# Patient Record
Sex: FEMALE | Race: BLACK OR AFRICAN AMERICAN | Employment: UNEMPLOYED | URBAN - METROPOLITAN AREA
[De-identification: names, ages, dates, MRNs, and addresses within clinical notes are randomized per-mention and may not be internally consistent; named-entity substitution may affect disease eponyms.]

---

## 2017-01-23 ENCOUNTER — OFFICE VISIT (OUTPATIENT)
Dept: OBGYN CLINIC | Facility: CLINIC | Age: 27
End: 2017-01-23

## 2017-01-23 VITALS
BODY MASS INDEX: 26.36 KG/M2 | DIASTOLIC BLOOD PRESSURE: 72 MMHG | HEIGHT: 66 IN | WEIGHT: 164 LBS | SYSTOLIC BLOOD PRESSURE: 122 MMHG

## 2017-01-23 DIAGNOSIS — Z34.81 PRENATAL CARE, SUBSEQUENT PREGNANCY, FIRST TRIMESTER: Primary | ICD-10-CM

## 2017-01-23 DIAGNOSIS — Z36.9 ENCOUNTER FOR ANTENATAL SCREENING OF MOTHER: ICD-10-CM

## 2017-01-23 DIAGNOSIS — Z12.4 ROUTINE PAPANICOLAOU SMEAR: ICD-10-CM

## 2017-01-23 PROCEDURE — 87086 URINE CULTURE/COLONY COUNT: CPT | Performed by: OBSTETRICS & GYNECOLOGY

## 2017-01-23 PROCEDURE — 88175 CYTOPATH C/V AUTO FLUID REDO: CPT | Performed by: OBSTETRICS & GYNECOLOGY

## 2017-01-23 RX ORDER — CLOBETASOL PROPIONATE 0.46 MG/ML
SOLUTION TOPICAL 2 TIMES DAILY
Refills: 1 | COMMUNITY
Start: 2016-11-03 | End: 2017-02-25

## 2017-01-23 NOTE — PROGRESS NOTES
. NOB visit. Hx of uncomplicated  with us in 2014. Not seen since PP visit in 2015. Was on OCP's with irregular menses until  and had Nexplanon inserted. Implant removed in 2016 for conception.  Regular menses Q 28 days since removal. L

## 2017-02-22 ENCOUNTER — TELEPHONE (OUTPATIENT)
Dept: OBGYN CLINIC | Facility: CLINIC | Age: 27
End: 2017-02-22

## 2017-02-22 NOTE — TELEPHONE ENCOUNTER
Patient called back and informed she needs to have labs completed. Lab hours & information given to patient. Verbalized understanding. No further questions or concerns.

## 2017-02-22 NOTE — TELEPHONE ENCOUNTER
Left message to call back regarding overdue prenatal labs. Patient has appointment 02/25/17 for NERI, post-poned labs until that date.

## 2017-02-23 ENCOUNTER — LAB ENCOUNTER (OUTPATIENT)
Dept: LAB | Age: 27
End: 2017-02-23
Attending: OBSTETRICS & GYNECOLOGY
Payer: COMMERCIAL

## 2017-02-23 DIAGNOSIS — Z36.9 ENCOUNTER FOR ANTENATAL SCREENING OF MOTHER: ICD-10-CM

## 2017-02-23 LAB
ANTIBODY SCREEN: NEGATIVE
BASOPHILS # BLD AUTO: 0.01 X10(3) UL (ref 0–0.1)
BASOPHILS NFR BLD AUTO: 0.1 %
EOSINOPHIL # BLD AUTO: 0.06 X10(3) UL (ref 0–0.3)
EOSINOPHIL NFR BLD AUTO: 0.8 %
ERYTHROCYTE [DISTWIDTH] IN BLOOD BY AUTOMATED COUNT: 13.7 % (ref 11.5–16)
HBV SURFACE AG SERPL QL IA: NONREACTIVE
HCT VFR BLD AUTO: 35.3 % (ref 34–50)
HGB BLD-MCNC: 11.7 G/DL (ref 12–16)
IMMATURE GRANULOCYTE COUNT: 0.02 X10(3) UL (ref 0–1)
IMMATURE GRANULOCYTE RATIO %: 0.3 %
LYMPHOCYTES # BLD AUTO: 0.84 X10(3) UL (ref 0.9–4)
LYMPHOCYTES NFR BLD AUTO: 11.6 %
MCH RBC QN AUTO: 31.6 PG (ref 27–33.2)
MCHC RBC AUTO-ENTMCNC: 33.1 G/DL (ref 31–37)
MCV RBC AUTO: 95.4 FL (ref 81–100)
MONOCYTES # BLD AUTO: 0.51 X10(3) UL (ref 0.1–0.6)
MONOCYTES NFR BLD AUTO: 7.1 %
NEUTROPHIL ABS PRELIM: 5.79 X10 (3) UL (ref 1.3–6.7)
NEUTROPHILS # BLD AUTO: 5.79 X10(3) UL (ref 1.3–6.7)
NEUTROPHILS NFR BLD AUTO: 80.1 %
PLATELET # BLD AUTO: 155 10(3)UL (ref 150–450)
RBC # BLD AUTO: 3.7 X10(6)UL (ref 3.8–5.1)
RED CELL DISTRIBUTION WIDTH-SD: 48.4 FL (ref 35.1–46.3)
RH BLOOD TYPE: POSITIVE
RUBV IGG SER QL: POSITIVE
T PALLIDUM AB SER QL IA: NONREACTIVE
WBC # BLD AUTO: 7.2 X10(3) UL (ref 4–13)

## 2017-02-23 PROCEDURE — 87340 HEPATITIS B SURFACE AG IA: CPT

## 2017-02-23 PROCEDURE — 86780 TREPONEMA PALLIDUM: CPT

## 2017-02-23 PROCEDURE — 86901 BLOOD TYPING SEROLOGIC RH(D): CPT

## 2017-02-23 PROCEDURE — 87389 HIV-1 AG W/HIV-1&-2 AB AG IA: CPT

## 2017-02-23 PROCEDURE — 36415 COLL VENOUS BLD VENIPUNCTURE: CPT

## 2017-02-23 PROCEDURE — 86900 BLOOD TYPING SEROLOGIC ABO: CPT

## 2017-02-23 PROCEDURE — 86850 RBC ANTIBODY SCREEN: CPT

## 2017-02-23 PROCEDURE — 86762 RUBELLA ANTIBODY: CPT

## 2017-02-23 PROCEDURE — 85025 COMPLETE CBC W/AUTO DIFF WBC: CPT

## 2017-02-25 ENCOUNTER — OFFICE VISIT (OUTPATIENT)
Dept: OBGYN CLINIC | Facility: CLINIC | Age: 27
End: 2017-02-25

## 2017-02-25 VITALS
SYSTOLIC BLOOD PRESSURE: 112 MMHG | WEIGHT: 171 LBS | DIASTOLIC BLOOD PRESSURE: 68 MMHG | BODY MASS INDEX: 27.48 KG/M2 | HEIGHT: 66 IN

## 2017-02-25 DIAGNOSIS — Z34.82 PRENATAL CARE, SUBSEQUENT PREGNANCY, SECOND TRIMESTER: Primary | ICD-10-CM

## 2017-02-25 RX ORDER — DOXYLAMINE SUCCINATE AND PYRIDOXINE HYDROCHLORIDE, DELAYED RELEASE TABLETS 10 MG/10 MG 10; 10 MG/1; MG/1
2 TABLET, DELAYED RELEASE ORAL NIGHTLY
Qty: 60 TABLET | Refills: 0 | Status: SHIPPED | OUTPATIENT
Start: 2017-02-25 | End: 2017-03-27

## 2017-02-25 NOTE — PROGRESS NOTES
NERI  Doing well  No complaints.  No LOF/VB/uctx  RH pos  Genetic testing declined (first, quad/AFP)  Anatomy Scan  (18-20weeks)

## 2017-02-27 ENCOUNTER — TELEPHONE (OUTPATIENT)
Dept: OBGYN CLINIC | Facility: CLINIC | Age: 27
End: 2017-02-27

## 2017-02-28 NOTE — TELEPHONE ENCOUNTER
PA received in [facility] for Diclegis. Called patient to discuss symptoms, onset and any other remedies tried. Pt states she has nausea without vomiting. Pt has not tried anything. Pt states she no longer wants Diclegis.   Pt advised on june, B6, sma

## 2017-03-29 ENCOUNTER — OFFICE VISIT (OUTPATIENT)
Dept: OBGYN CLINIC | Facility: CLINIC | Age: 27
End: 2017-03-29

## 2017-03-29 ENCOUNTER — APPOINTMENT (OUTPATIENT)
Dept: OBGYN CLINIC | Facility: CLINIC | Age: 27
End: 2017-03-29

## 2017-03-29 VITALS
BODY MASS INDEX: 27.83 KG/M2 | WEIGHT: 173.19 LBS | SYSTOLIC BLOOD PRESSURE: 116 MMHG | DIASTOLIC BLOOD PRESSURE: 64 MMHG | HEIGHT: 66 IN

## 2017-03-29 DIAGNOSIS — Z34.82 PRENATAL CARE, SUBSEQUENT PREGNANCY, SECOND TRIMESTER: ICD-10-CM

## 2017-03-29 DIAGNOSIS — Z36.89 ENCOUNTER FOR FETAL ANATOMIC SURVEY: ICD-10-CM

## 2017-03-29 DIAGNOSIS — Z3A.20 20 WEEKS GESTATION OF PREGNANCY: Primary | ICD-10-CM

## 2017-03-29 PROCEDURE — 76805 OB US >/= 14 WKS SNGL FETUS: CPT | Performed by: OBSTETRICS & GYNECOLOGY

## 2017-03-29 NOTE — PROGRESS NOTES
NERI  Doing well  No complaints.  No LOF/VB/uctx  RH +  Genetic testing declines (first, quad/AFP)  Anatomy Scan normal today (18-20weeks)  rtc 4 wks  Gct/cbc on rtc          Saravia IUP at 20w1d   Measuring 20w1d, efw 343 gm   Vtx, fhr 132   Normal anatom

## 2017-04-03 ENCOUNTER — TELEPHONE (OUTPATIENT)
Dept: OBGYN CLINIC | Facility: CLINIC | Age: 27
End: 2017-04-03

## 2017-04-03 NOTE — TELEPHONE ENCOUNTER
Pt c/o vaginal itching since yesterday. Pt denies any vaginal discharge or odor. Pt used vagisil last night and this morning. Pt had yeast infections in last pregnancy with   Same symptoms.   Pt requesting RX    Dr. Daley Setting- please advise if okay to ord

## 2017-04-06 ENCOUNTER — TELEPHONE (OUTPATIENT)
Dept: OBGYN CLINIC | Facility: CLINIC | Age: 27
End: 2017-04-06

## 2017-04-06 NOTE — TELEPHONE ENCOUNTER
Pt started Monistat 7 day treatment on Monday. Pt reports mild improvement in itching, but intense burning sensation with application of Monistat. Pt given appt today for evaluation.

## 2017-04-17 ENCOUNTER — TELEPHONE (OUTPATIENT)
Dept: OBGYN CLINIC | Facility: CLINIC | Age: 27
End: 2017-04-17

## 2017-04-17 NOTE — TELEPHONE ENCOUNTER
Patient had yeast infection symptoms last week. Was advised to use Monistat OTC, noted no improvement. She was given appointment but did not show. Advised patient I can schedule appointment for today, states she only wants Wed.   Denies any vaginal bleed

## 2017-04-19 ENCOUNTER — OFFICE VISIT (OUTPATIENT)
Dept: OBGYN CLINIC | Facility: CLINIC | Age: 27
End: 2017-04-19

## 2017-04-19 VITALS
HEIGHT: 66 IN | BODY MASS INDEX: 28.61 KG/M2 | WEIGHT: 178 LBS | DIASTOLIC BLOOD PRESSURE: 60 MMHG | SYSTOLIC BLOOD PRESSURE: 110 MMHG

## 2017-04-19 DIAGNOSIS — N76.0 VAGINITIS AND VULVOVAGINITIS: Primary | ICD-10-CM

## 2017-04-19 PROCEDURE — 87510 GARDNER VAG DNA DIR PROBE: CPT | Performed by: NURSE PRACTITIONER

## 2017-04-19 PROCEDURE — 87480 CANDIDA DNA DIR PROBE: CPT | Performed by: NURSE PRACTITIONER

## 2017-04-19 PROCEDURE — 87660 TRICHOMONAS VAGIN DIR PROBE: CPT | Performed by: NURSE PRACTITIONER

## 2017-04-19 PROCEDURE — 99213 OFFICE O/P EST LOW 20 MIN: CPT | Performed by: NURSE PRACTITIONER

## 2017-04-19 NOTE — PROGRESS NOTES
Here for suspected yeast infection, used Monistat for 4 days starting 4/6/17. Had approximately 2 hours of worsening irritation and swelling after monistat use. Discontinued drug at that point with some relief. Still having intermittent itching, daily.

## 2017-04-20 NOTE — PROGRESS NOTES
Quick Note:    Patient notified of negative vaginitis panel/ She may continue to use topical RX given at time of appointment for symptom relief.  ______

## 2017-04-26 ENCOUNTER — OFFICE VISIT (OUTPATIENT)
Dept: OBGYN CLINIC | Facility: CLINIC | Age: 27
End: 2017-04-26

## 2017-04-26 VITALS
SYSTOLIC BLOOD PRESSURE: 108 MMHG | BODY MASS INDEX: 28.45 KG/M2 | DIASTOLIC BLOOD PRESSURE: 70 MMHG | WEIGHT: 177 LBS | HEIGHT: 66 IN

## 2017-04-26 DIAGNOSIS — Z34.82 PRENATAL CARE, SUBSEQUENT PREGNANCY, SECOND TRIMESTER: Primary | ICD-10-CM

## 2017-04-26 NOTE — PROGRESS NOTES
No problems since last seen. Good FM. Unremarkable midtrimester screening ultrasound with posterior placenta and carrying male. Reminded to get GL prior to next visit. See in one month.

## 2017-05-19 ENCOUNTER — LAB ENCOUNTER (OUTPATIENT)
Dept: LAB | Age: 27
End: 2017-05-19
Attending: OBSTETRICS & GYNECOLOGY
Payer: COMMERCIAL

## 2017-05-19 DIAGNOSIS — Z36.89 ENCOUNTER FOR FETAL ANATOMIC SURVEY: ICD-10-CM

## 2017-05-19 DIAGNOSIS — Z34.82 PRENATAL CARE, SUBSEQUENT PREGNANCY, SECOND TRIMESTER: ICD-10-CM

## 2017-05-19 DIAGNOSIS — Z3A.20 20 WEEKS GESTATION OF PREGNANCY: ICD-10-CM

## 2017-05-19 PROCEDURE — 85025 COMPLETE CBC W/AUTO DIFF WBC: CPT | Performed by: OBSTETRICS & GYNECOLOGY

## 2017-05-19 PROCEDURE — 36415 COLL VENOUS BLD VENIPUNCTURE: CPT | Performed by: OBSTETRICS & GYNECOLOGY

## 2017-05-19 PROCEDURE — 82950 GLUCOSE TEST: CPT | Performed by: OBSTETRICS & GYNECOLOGY

## 2017-05-19 NOTE — PROGRESS NOTES
Quick Note:    Normal 1 hour GTT/ low hemoglobin. Please advise patient to start OTC Slow FE 1 pill PO twice daily. She may want to increase fiber, fluids.  Repeat CBC 4 weeks or as needed if symptomatic.  ______

## 2017-05-24 ENCOUNTER — OFFICE VISIT (OUTPATIENT)
Dept: OBGYN CLINIC | Facility: CLINIC | Age: 27
End: 2017-05-24

## 2017-05-24 VITALS
HEIGHT: 66 IN | BODY MASS INDEX: 29.25 KG/M2 | DIASTOLIC BLOOD PRESSURE: 68 MMHG | WEIGHT: 182 LBS | SYSTOLIC BLOOD PRESSURE: 114 MMHG

## 2017-05-24 DIAGNOSIS — Z23 NEED FOR VACCINATION: ICD-10-CM

## 2017-05-24 DIAGNOSIS — Z34.83 PRENATAL CARE, SUBSEQUENT PREGNANCY, THIRD TRIMESTER: Primary | ICD-10-CM

## 2017-05-24 PROCEDURE — 90471 IMMUNIZATION ADMIN: CPT | Performed by: OBSTETRICS & GYNECOLOGY

## 2017-05-24 PROCEDURE — 90715 TDAP VACCINE 7 YRS/> IM: CPT | Performed by: OBSTETRICS & GYNECOLOGY

## 2017-05-24 NOTE — PROGRESS NOTES
NERI  Doing well, +FM  Denies VB/LOF/uctx  Rh pos, TDAP received  1hr glucose reviewed, wnl hgb 9.8 iron bid  RTC in 2 wks  Fetal movement instructions given  C/o LBP since last night, supportive measures advised, heating pad, tylenol

## 2017-06-07 ENCOUNTER — OFFICE VISIT (OUTPATIENT)
Dept: OBGYN CLINIC | Facility: CLINIC | Age: 27
End: 2017-06-07

## 2017-06-07 VITALS
WEIGHT: 183 LBS | BODY MASS INDEX: 29.41 KG/M2 | DIASTOLIC BLOOD PRESSURE: 60 MMHG | SYSTOLIC BLOOD PRESSURE: 102 MMHG | HEIGHT: 66 IN

## 2017-06-07 DIAGNOSIS — Z34.83 PRENATAL CARE, SUBSEQUENT PREGNANCY, THIRD TRIMESTER: Primary | ICD-10-CM

## 2017-06-21 ENCOUNTER — OFFICE VISIT (OUTPATIENT)
Dept: OBGYN CLINIC | Facility: CLINIC | Age: 27
End: 2017-06-21

## 2017-06-21 VITALS
HEIGHT: 66 IN | SYSTOLIC BLOOD PRESSURE: 100 MMHG | WEIGHT: 186 LBS | DIASTOLIC BLOOD PRESSURE: 56 MMHG | BODY MASS INDEX: 29.89 KG/M2

## 2017-06-21 DIAGNOSIS — O99.013 ANTEPARTUM ANEMIA, THIRD TRIMESTER: ICD-10-CM

## 2017-06-21 DIAGNOSIS — Z34.83 PRENATAL CARE, SUBSEQUENT PREGNANCY, THIRD TRIMESTER: Primary | ICD-10-CM

## 2017-07-03 ENCOUNTER — TELEPHONE (OUTPATIENT)
Dept: OBGYN CLINIC | Facility: CLINIC | Age: 27
End: 2017-07-03

## 2017-07-03 NOTE — TELEPHONE ENCOUNTER
Pt states she would like to travel to [place] this week by air. Advised pt, per our policy, we advise against any travel during the 3rd trimester. Patient verbalized understanding.

## 2017-07-07 ENCOUNTER — OFFICE VISIT (OUTPATIENT)
Dept: OBGYN CLINIC | Facility: CLINIC | Age: 27
End: 2017-07-07

## 2017-07-07 VITALS
HEIGHT: 66 IN | WEIGHT: 189 LBS | BODY MASS INDEX: 30.37 KG/M2 | DIASTOLIC BLOOD PRESSURE: 58 MMHG | SYSTOLIC BLOOD PRESSURE: 98 MMHG

## 2017-07-07 DIAGNOSIS — Z34.83 PRENATAL CARE, SUBSEQUENT PREGNANCY, THIRD TRIMESTER: Primary | ICD-10-CM

## 2017-07-07 DIAGNOSIS — O99.013 ANTEPARTUM ANEMIA, THIRD TRIMESTER: ICD-10-CM

## 2017-07-07 RX ORDER — MELATONIN
325 2 TIMES DAILY WITH MEALS
COMMUNITY
End: 2017-10-10

## 2017-07-15 ENCOUNTER — TELEPHONE (OUTPATIENT)
Dept: OBGYN CLINIC | Facility: CLINIC | Age: 27
End: 2017-07-15

## 2017-07-15 NOTE — TELEPHONE ENCOUNTER
C/O upper abdominal pain, comes and goes. ?  Contractions  Discussed, to come in to be seen if persists

## 2017-07-17 ENCOUNTER — HOSPITAL ENCOUNTER (EMERGENCY)
Facility: HOSPITAL | Age: 27
Discharge: STILL ADMITTED OR ADMINISTRATIVE | End: 2017-07-19
Payer: COMMERCIAL

## 2017-07-17 ENCOUNTER — LAB ENCOUNTER (OUTPATIENT)
Dept: LAB | Age: 27
End: 2017-07-17
Attending: NURSE PRACTITIONER
Payer: COMMERCIAL

## 2017-07-17 ENCOUNTER — OFFICE VISIT (OUTPATIENT)
Dept: OBGYN CLINIC | Facility: CLINIC | Age: 27
End: 2017-07-17

## 2017-07-17 ENCOUNTER — TELEPHONE (OUTPATIENT)
Dept: OBGYN CLINIC | Facility: CLINIC | Age: 27
End: 2017-07-17

## 2017-07-17 VITALS
DIASTOLIC BLOOD PRESSURE: 74 MMHG | HEIGHT: 66 IN | SYSTOLIC BLOOD PRESSURE: 122 MMHG | BODY MASS INDEX: 30.22 KG/M2 | WEIGHT: 188 LBS

## 2017-07-17 DIAGNOSIS — O99.013 ANTEPARTUM ANEMIA, THIRD TRIMESTER: ICD-10-CM

## 2017-07-17 DIAGNOSIS — Z34.83 PRENATAL CARE, SUBSEQUENT PREGNANCY, THIRD TRIMESTER: Primary | ICD-10-CM

## 2017-07-17 DIAGNOSIS — Z34.83 PRENATAL CARE, SUBSEQUENT PREGNANCY, THIRD TRIMESTER: ICD-10-CM

## 2017-07-17 LAB
ALBUMIN SERPL-MCNC: 2.8 G/DL (ref 3.5–4.8)
ALP LIVER SERPL-CCNC: 145 U/L (ref 37–98)
ALT SERPL-CCNC: 16 U/L (ref 14–54)
AST SERPL-CCNC: 25 U/L (ref 15–41)
BASOPHILS # BLD AUTO: 0.01 X10(3) UL (ref 0–0.1)
BASOPHILS NFR BLD AUTO: 0.2 %
BILIRUB SERPL-MCNC: 0.3 MG/DL (ref 0.1–2)
BUN BLD-MCNC: 5 MG/DL (ref 8–20)
CALCIUM BLD-MCNC: 9.1 MG/DL (ref 8.3–10.3)
CHLORIDE: 107 MMOL/L (ref 101–111)
CO2: 21 MMOL/L (ref 22–32)
CREAT BLD-MCNC: 0.6 MG/DL (ref 0.55–1.02)
EOSINOPHIL # BLD AUTO: 0.05 X10(3) UL (ref 0–0.3)
EOSINOPHIL NFR BLD AUTO: 1 %
ERYTHROCYTE [DISTWIDTH] IN BLOOD BY AUTOMATED COUNT: 15 % (ref 11.5–16)
GLUCOSE BLD-MCNC: 96 MG/DL (ref 70–99)
HCT VFR BLD AUTO: 30.4 % (ref 34–50)
HGB BLD-MCNC: 9.7 G/DL (ref 12–16)
IMMATURE GRANULOCYTE COUNT: 0.03 X10(3) UL (ref 0–1)
IMMATURE GRANULOCYTE RATIO %: 0.6 %
LYMPHOCYTES # BLD AUTO: 1.14 X10(3) UL (ref 0.9–4)
LYMPHOCYTES NFR BLD AUTO: 23 %
M PROTEIN MFR SERPL ELPH: 7.2 G/DL (ref 6.1–8.3)
MCH RBC QN AUTO: 30.2 PG (ref 27–33.2)
MCHC RBC AUTO-ENTMCNC: 31.9 G/DL (ref 31–37)
MCV RBC AUTO: 94.7 FL (ref 81–100)
MONOCYTES # BLD AUTO: 0.44 X10(3) UL (ref 0.1–0.6)
MONOCYTES NFR BLD AUTO: 8.9 %
NEUTROPHIL ABS PRELIM: 3.28 X10 (3) UL (ref 1.3–6.7)
NEUTROPHILS # BLD AUTO: 3.28 X10(3) UL (ref 1.3–6.7)
NEUTROPHILS NFR BLD AUTO: 66.3 %
PLATELET # BLD AUTO: 142 10(3)UL (ref 150–450)
POTASSIUM SERPL-SCNC: 4.2 MMOL/L (ref 3.6–5.1)
RBC # BLD AUTO: 3.21 X10(6)UL (ref 3.8–5.1)
RED CELL DISTRIBUTION WIDTH-SD: 51.8 FL (ref 35.1–46.3)
SODIUM SERPL-SCNC: 138 MMOL/L (ref 136–144)
WBC # BLD AUTO: 5 X10(3) UL (ref 4–13)

## 2017-07-17 PROCEDURE — 87081 CULTURE SCREEN ONLY: CPT | Performed by: NURSE PRACTITIONER

## 2017-07-17 PROCEDURE — 85025 COMPLETE CBC W/AUTO DIFF WBC: CPT

## 2017-07-17 PROCEDURE — 80053 COMPREHEN METABOLIC PANEL: CPT

## 2017-07-17 PROCEDURE — 36415 COLL VENOUS BLD VENIPUNCTURE: CPT

## 2017-07-17 NOTE — TELEPHONE ENCOUNTER
Patient stated she has been having persistent belly pain for > 1 week. This week it has gotten worse. Spoke to Dr. Fior Mukherjee on Saturday and he stated if it persisted or got worse patient would need to be seen.   She denies any cramping, LOF, decreased FM, or

## 2017-07-17 NOTE — PROGRESS NOTES
NERI  Doing well, +FM  Denies VB/LOF  Unsure if having contractions, pain comes and goes in upper abdomen R>L  Had it on the way here, had william 4 times yesterday  Pain lasts seconds to minute, the longest episode up to 10 minutes of intermittent pain  Mode

## 2017-07-18 NOTE — PROGRESS NOTES
Patient notified of CBC and verbalizes understanding  States she is not taking iron bid because she was having heart burn  Patient notified that it is important to take iron as instructed because her hgb is low.

## 2017-07-18 NOTE — PROGRESS NOTES
Mild decrease in platelets. Please call patient and be sure she is taking and OTC SLOW FE daily as her hemoglobin has not improved since the last CBC.

## 2017-07-26 ENCOUNTER — OFFICE VISIT (OUTPATIENT)
Dept: OBGYN CLINIC | Facility: CLINIC | Age: 27
End: 2017-07-26

## 2017-07-26 VITALS
WEIGHT: 189 LBS | DIASTOLIC BLOOD PRESSURE: 60 MMHG | SYSTOLIC BLOOD PRESSURE: 106 MMHG | HEIGHT: 66 IN | BODY MASS INDEX: 30.37 KG/M2

## 2017-07-26 DIAGNOSIS — Z3A.37 37 WEEKS GESTATION OF PREGNANCY: Primary | ICD-10-CM

## 2017-07-26 DIAGNOSIS — Z34.83 PRENATAL CARE, SUBSEQUENT PREGNANCY, THIRD TRIMESTER: ICD-10-CM

## 2017-07-26 NOTE — PROGRESS NOTES
NERI  Doing well, +FM  Denies VB/LOF/uctx  Mode of delivery:   anticipated  SVE deferred,   Mild folliculitis otc antibiotic cream  GBS collected (35-37)neg  RTC 1 week

## 2017-08-03 ENCOUNTER — OFFICE VISIT (OUTPATIENT)
Dept: OBGYN CLINIC | Facility: CLINIC | Age: 27
End: 2017-08-03

## 2017-08-03 VITALS
WEIGHT: 186 LBS | HEIGHT: 66 IN | BODY MASS INDEX: 29.89 KG/M2 | SYSTOLIC BLOOD PRESSURE: 102 MMHG | DIASTOLIC BLOOD PRESSURE: 56 MMHG

## 2017-08-03 DIAGNOSIS — O36.8190 DECREASED FETAL MOVEMENT AFFECTING MANAGEMENT OF MOTHER, ANTEPARTUM: Primary | ICD-10-CM

## 2017-08-03 DIAGNOSIS — Z34.83 PRENATAL CARE, SUBSEQUENT PREGNANCY, THIRD TRIMESTER: Primary | ICD-10-CM

## 2017-08-03 PROCEDURE — 59025 FETAL NON-STRESS TEST: CPT | Performed by: NURSE PRACTITIONER

## 2017-08-03 NOTE — PATIENT INSTRUCTIONS
FETAL MOVEMENT CHART    Begin counting the baby's movements when you awake in the morning, or at approximately 9:00 a.m. Count ten separate times that the baby moves. A movement can be either a kick, a swish, a turn or a flip of the baby inside.     Xenia Junior

## 2017-08-03 NOTE — PROGRESS NOTES
NERI  Doing well  Decreased FM the last 2 days  Denies VB/LOF/uctx  Mode of delivery:  anticipated  Labor precautions discussed  SVE tight 1/ 40%/-2  NST done- see note  RTC 1 week

## 2017-08-09 ENCOUNTER — OFFICE VISIT (OUTPATIENT)
Dept: OBGYN CLINIC | Facility: CLINIC | Age: 27
End: 2017-08-09

## 2017-08-09 VITALS
DIASTOLIC BLOOD PRESSURE: 60 MMHG | BODY MASS INDEX: 30.53 KG/M2 | HEIGHT: 66 IN | WEIGHT: 190 LBS | SYSTOLIC BLOOD PRESSURE: 104 MMHG

## 2017-08-09 DIAGNOSIS — Z34.83 PRENATAL CARE, SUBSEQUENT PREGNANCY, THIRD TRIMESTER: Primary | ICD-10-CM

## 2017-08-14 ENCOUNTER — TELEPHONE (OUTPATIENT)
Dept: OBGYN CLINIC | Facility: CLINIC | Age: 27
End: 2017-08-14

## 2017-08-14 NOTE — TELEPHONE ENCOUNTER
Pt reports irregular contractions since this morning; approx q 30 mins; tight and mildly uncomfortable. Baby moving well; denies bleeding or LOF.   Advised pt to stay hydrated, void frequently and rest.  Advised pt to monitor contractions; go to L&D for re

## 2017-08-15 ENCOUNTER — OFFICE VISIT (OUTPATIENT)
Dept: OBGYN CLINIC | Facility: CLINIC | Age: 27
End: 2017-08-15

## 2017-08-15 VITALS
BODY MASS INDEX: 30.22 KG/M2 | SYSTOLIC BLOOD PRESSURE: 100 MMHG | DIASTOLIC BLOOD PRESSURE: 60 MMHG | WEIGHT: 188 LBS | HEIGHT: 66 IN

## 2017-08-15 DIAGNOSIS — O48.0 POST-TERM PREGNANCY, 40-42 WEEKS OF GESTATION: ICD-10-CM

## 2017-08-15 DIAGNOSIS — Z34.83 PRENATAL CARE, SUBSEQUENT PREGNANCY, THIRD TRIMESTER: Primary | ICD-10-CM

## 2017-08-15 PROCEDURE — 59025 FETAL NON-STRESS TEST: CPT | Performed by: NURSE PRACTITIONER

## 2017-08-15 NOTE — PROGRESS NOTES
NERI  Doing well, +FM  Denies VB/LOF- occasional contractions, irregular and not allen able since yesterday  Mode of delivery:  anticipated  Labor precautions discussed  SVE unchanged  IOL 17 at 0715  RTC 3 days with NST  NST reactive

## 2017-08-17 ENCOUNTER — TELEPHONE (OUTPATIENT)
Dept: OBGYN UNIT | Facility: HOSPITAL | Age: 27
End: 2017-08-17

## 2017-08-18 ENCOUNTER — OFFICE VISIT (OUTPATIENT)
Dept: OBGYN CLINIC | Facility: CLINIC | Age: 27
End: 2017-08-18

## 2017-08-18 ENCOUNTER — HOSPITAL ENCOUNTER (INPATIENT)
Facility: HOSPITAL | Age: 27
LOS: 2 days | Discharge: HOME | End: 2017-08-20
Attending: OBSTETRICS & GYNECOLOGY | Admitting: OBSTETRICS & GYNECOLOGY
Payer: COMMERCIAL

## 2017-08-18 VITALS
SYSTOLIC BLOOD PRESSURE: 110 MMHG | BODY MASS INDEX: 30.37 KG/M2 | DIASTOLIC BLOOD PRESSURE: 70 MMHG | WEIGHT: 189 LBS | HEIGHT: 66 IN

## 2017-08-18 DIAGNOSIS — Z34.83 PRENATAL CARE, SUBSEQUENT PREGNANCY, THIRD TRIMESTER: Primary | ICD-10-CM

## 2017-08-18 DIAGNOSIS — O48.0 POST-TERM PREGNANCY, 40-42 WEEKS OF GESTATION: ICD-10-CM

## 2017-08-18 PROBLEM — Z34.90 PREGNANT (HCC): Status: ACTIVE | Noted: 2017-08-18

## 2017-08-18 PROBLEM — Z34.90 PREGNANT: Status: ACTIVE | Noted: 2017-08-18

## 2017-08-18 LAB
ANTIBODY SCREEN: NEGATIVE
BILIRUBIN URINE: NEGATIVE
BLOOD URINE: NEGATIVE
CONTROL RUN WITHIN 24 HOURS?: YES
ERYTHROCYTE [DISTWIDTH] IN BLOOD BY AUTOMATED COUNT: 15.3 % (ref 11.5–16)
GLUCOSE URINE: NEGATIVE
HCT VFR BLD AUTO: 31.4 % (ref 34–50)
HGB BLD-MCNC: 10.1 G/DL (ref 12–16)
KETONE URINE: NEGATIVE
MCH RBC QN AUTO: 30.4 PG (ref 27–33.2)
MCHC RBC AUTO-ENTMCNC: 32.2 G/DL (ref 31–37)
MCV RBC AUTO: 94.6 FL (ref 81–100)
NITRITE URINE: NEGATIVE
PH URINE: 7 (ref 5–8)
PLATELET # BLD AUTO: 129 10(3)UL (ref 150–450)
PROTEIN URINE: NEGATIVE
RBC # BLD AUTO: 3.32 X10(6)UL (ref 3.8–5.1)
RED CELL DISTRIBUTION WIDTH-SD: 52.8 FL (ref 35.1–46.3)
RH BLOOD TYPE: POSITIVE
SPEC GRAVITY: 1.01 (ref 1–1.03)
T PALLIDUM AB SER QL IA: NONREACTIVE
URINE CLARITY: CLEAR
URINE COLOR: YELLOW
UROBILINOGEN URINE: 0.2
WBC # BLD AUTO: 4.8 X10(3) UL (ref 4–13)

## 2017-08-18 PROCEDURE — 59025 FETAL NON-STRESS TEST: CPT | Performed by: NURSE PRACTITIONER

## 2017-08-18 RX ORDER — NALBUPHINE HCL 10 MG/ML
2.5 AMPUL (ML) INJECTION
Status: DISCONTINUED | OUTPATIENT
Start: 2017-08-18 | End: 2017-08-20

## 2017-08-18 RX ORDER — EPHEDRINE SULFATE 50 MG/ML
5 INJECTION, SOLUTION INTRAVENOUS AS NEEDED
Status: DISCONTINUED | OUTPATIENT
Start: 2017-08-18 | End: 2017-08-19

## 2017-08-18 RX ORDER — DEXTROSE, SODIUM CHLORIDE, SODIUM LACTATE, POTASSIUM CHLORIDE, AND CALCIUM CHLORIDE 5; .6; .31; .03; .02 G/100ML; G/100ML; G/100ML; G/100ML; G/100ML
INJECTION, SOLUTION INTRAVENOUS AS NEEDED
Status: DISCONTINUED | OUTPATIENT
Start: 2017-08-18 | End: 2017-08-19 | Stop reason: HOSPADM

## 2017-08-18 RX ORDER — TERBUTALINE SULFATE 1 MG/ML
0.25 INJECTION, SOLUTION SUBCUTANEOUS AS NEEDED
Status: DISCONTINUED | OUTPATIENT
Start: 2017-08-18 | End: 2017-08-19 | Stop reason: HOSPADM

## 2017-08-18 RX ORDER — IBUPROFEN 600 MG/1
600 TABLET ORAL ONCE AS NEEDED
Status: DISCONTINUED | OUTPATIENT
Start: 2017-08-18 | End: 2017-08-19 | Stop reason: HOSPADM

## 2017-08-18 RX ORDER — SODIUM CHLORIDE, SODIUM LACTATE, POTASSIUM CHLORIDE, CALCIUM CHLORIDE 600; 310; 30; 20 MG/100ML; MG/100ML; MG/100ML; MG/100ML
INJECTION, SOLUTION INTRAVENOUS CONTINUOUS
Status: DISCONTINUED | OUTPATIENT
Start: 2017-08-18 | End: 2017-08-19 | Stop reason: HOSPADM

## 2017-08-18 RX ORDER — TRISODIUM CITRATE DIHYDRATE AND CITRIC ACID MONOHYDRATE 500; 334 MG/5ML; MG/5ML
30 SOLUTION ORAL AS NEEDED
Status: DISCONTINUED | OUTPATIENT
Start: 2017-08-18 | End: 2017-08-19 | Stop reason: HOSPADM

## 2017-08-18 NOTE — PROGRESS NOTES
Patient here for NERI/ NST  IOL scheduled for tomorrow am  Patient having contractions intermittently the last 3-4 days  Contractions every 8-9 minutes today  Cervix unchanged  Rev.  S/sx labor  To L & D with increase in frequency of ctrx, ROM, or bleeding

## 2017-08-18 NOTE — PROGRESS NOTES
Pt admit to tr4 with c/o contractions since 5am this morning and they are getting stronger. efm explained and applied, hx and assessment done, plan of care discussed with pt and , sve, orient to room, call lite in reach.  Dr Jolene Bird in dept,discuss

## 2017-08-19 LAB
BASOPHILS # BLD AUTO: 0.01 X10(3) UL (ref 0–0.1)
BASOPHILS NFR BLD AUTO: 0.1 %
EOSINOPHIL # BLD AUTO: 0.01 X10(3) UL (ref 0–0.3)
EOSINOPHIL NFR BLD AUTO: 0.1 %
ERYTHROCYTE [DISTWIDTH] IN BLOOD BY AUTOMATED COUNT: 15.2 % (ref 11.5–16)
HCT VFR BLD AUTO: 33.7 % (ref 34–50)
HGB BLD-MCNC: 10.8 G/DL (ref 12–16)
IMMATURE GRANULOCYTE COUNT: 0.03 X10(3) UL (ref 0–1)
IMMATURE GRANULOCYTE RATIO %: 0.3 %
LYMPHOCYTES # BLD AUTO: 1.14 X10(3) UL (ref 0.9–4)
LYMPHOCYTES NFR BLD AUTO: 11.9 %
MCH RBC QN AUTO: 30.4 PG (ref 27–33.2)
MCHC RBC AUTO-ENTMCNC: 32 G/DL (ref 31–37)
MCV RBC AUTO: 94.9 FL (ref 81–100)
MONOCYTES # BLD AUTO: 0.89 X10(3) UL (ref 0.1–0.6)
MONOCYTES NFR BLD AUTO: 9.3 %
NEUTROPHIL ABS PRELIM: 7.54 X10 (3) UL (ref 1.3–6.7)
NEUTROPHILS # BLD AUTO: 7.54 X10(3) UL (ref 1.3–6.7)
NEUTROPHILS NFR BLD AUTO: 78.3 %
PLATELET # BLD AUTO: 151 10(3)UL (ref 150–450)
RBC # BLD AUTO: 3.55 X10(6)UL (ref 3.8–5.1)
RED CELL DISTRIBUTION WIDTH-SD: 53 FL (ref 35.1–46.3)
WBC # BLD AUTO: 9.6 X10(3) UL (ref 4–13)

## 2017-08-19 PROCEDURE — 59400 OBSTETRICAL CARE: CPT | Performed by: OBSTETRICS & GYNECOLOGY

## 2017-08-19 RX ORDER — ACETAMINOPHEN 325 MG/1
650 TABLET ORAL EVERY 4 HOURS PRN
Status: DISCONTINUED | OUTPATIENT
Start: 2017-08-19 | End: 2017-08-20

## 2017-08-19 RX ORDER — BISACODYL 10 MG
10 SUPPOSITORY, RECTAL RECTAL ONCE AS NEEDED
Status: ACTIVE | OUTPATIENT
Start: 2017-08-19 | End: 2017-08-19

## 2017-08-19 RX ORDER — HYDROCODONE BITARTRATE AND ACETAMINOPHEN 5; 325 MG/1; MG/1
1 TABLET ORAL EVERY 4 HOURS PRN
Status: DISCONTINUED | OUTPATIENT
Start: 2017-08-19 | End: 2017-08-20

## 2017-08-19 RX ORDER — DOCUSATE SODIUM 100 MG/1
100 CAPSULE, LIQUID FILLED ORAL
Status: DISCONTINUED | OUTPATIENT
Start: 2017-08-19 | End: 2017-08-20

## 2017-08-19 RX ORDER — IBUPROFEN 600 MG/1
600 TABLET ORAL EVERY 6 HOURS
Status: DISCONTINUED | OUTPATIENT
Start: 2017-08-19 | End: 2017-08-20

## 2017-08-19 RX ORDER — HYDROCODONE BITARTRATE AND ACETAMINOPHEN 5; 325 MG/1; MG/1
2 TABLET ORAL EVERY 4 HOURS PRN
Status: DISCONTINUED | OUTPATIENT
Start: 2017-08-19 | End: 2017-08-20

## 2017-08-19 RX ORDER — SIMETHICONE 80 MG
80 TABLET,CHEWABLE ORAL 3 TIMES DAILY PRN
Status: DISCONTINUED | OUTPATIENT
Start: 2017-08-19 | End: 2017-08-20

## 2017-08-19 RX ORDER — ZOLPIDEM TARTRATE 5 MG/1
5 TABLET ORAL NIGHTLY PRN
Status: DISCONTINUED | OUTPATIENT
Start: 2017-08-19 | End: 2017-08-20

## 2017-08-19 NOTE — PLAN OF CARE
Patient/Family Long Term Goal Progressing      Patient/Family Short Term Goal Progressing      Long Term Goal:Experiences normal postpartum course Progressing      Optimize infant feeding at the breast Progressing      Establishment of adequate milk supply

## 2017-08-19 NOTE — PROGRESS NOTES
PPD#1  No C/O  Afebrile, VS stable  Hg: 10.8  Fundus firm, minimal bleeding  Doing well, home tomorrow

## 2017-08-19 NOTE — PROGRESS NOTES
Received report assuming care of the pt. Dr Luann Davenport at rn station- in room to see pt. Pt in the bed that is in the low locked position. efm in  Place.   poc reviewed per md.

## 2017-08-19 NOTE — PROGRESS NOTES
Pt assisted to br voided,pericare done. Pt to w/c for transfer.   Pt to m/b in stable cond with baby un arms

## 2017-08-19 NOTE — L&D DELIVERY NOTE
Sonny, Pending  [[id number]]     Labor Events     labor?:  No    steroids?:  None   Antibiotics received during labor?:  No   Rupture date:  17   Rupture time:     Rupture type:  AROM   Fluid color:  Clear   Augmentation:  Oxytocin, A As the head was delivered the legs were lowered and the perineum was supported to decrease the risk of tearing. The infants nose and mouth were bulb suctioned and the shoulders rotated easily. The infant was dried and suctioned.   The cord was doubly clam

## 2017-08-19 NOTE — H&P
Aldo Real Sonny Patient Status:  Inpatient    1990 MRN [id number]   Location [address] Attending Sherif Gardiner MD    0 PCP No primary care provider on file.      CC: patient auscultation bilaterally   Heart:   regular rate and rhythm, regular rate and rhythm, S1, S2 normal, no murmur, click, rub or gallop   Abdomen: FHT present   Fetal Surveillance:  140 BPM   moderate variability, accelerations present.       Cervix: 3 cm/80%/

## 2017-08-19 NOTE — PROGRESS NOTES
NURSING ADMISSION NOTE      Patient admitted via Wheelchair with baby in arms. Baby transferred into [facility] [place]. ID bands verified, [employer] KISS[employer] security bracelets in place. Oriented to room. Safety precautions initiated. Bed in low position.   Call

## 2017-08-19 NOTE — PLAN OF CARE
Patient/Family Goals    • Patient/Family Long Term Goal Progressing    • Patient/Family Short Term Goal Progressing        POSTPARTUM    • Long Term Goal:Experiences normal postpartum course Progressing    • Optimize infant feeding at the breast Progressin

## 2017-08-20 VITALS
HEIGHT: 66 IN | SYSTOLIC BLOOD PRESSURE: 109 MMHG | WEIGHT: 189 LBS | DIASTOLIC BLOOD PRESSURE: 73 MMHG | RESPIRATION RATE: 17 BRPM | HEART RATE: 79 BPM | TEMPERATURE: 99 F | BODY MASS INDEX: 30.37 KG/M2

## 2017-08-20 PROBLEM — Z34.90 PREGNANT (HCC): Status: RESOLVED | Noted: 2017-08-18 | Resolved: 2017-08-20

## 2017-08-20 PROBLEM — Z34.90 PREGNANT: Status: RESOLVED | Noted: 2017-08-18 | Resolved: 2017-08-20

## 2017-08-20 NOTE — PROGRESS NOTES
Pt discharged to lobby in stable condition with spouse,  and PCT escort. Kisses tag removed, pt verbalizes understanding of all discharge instructions at [facility] time.

## 2017-08-20 NOTE — PLAN OF CARE
Patient/Family Goals    • Patient/Family Long Term Goal Completed    • Patient/Family Short Term Goal Completed        POSTPARTUM    • Long Term Goal:Experiences normal postpartum course Completed    • Optimize infant feeding at the breast Completed    • E

## 2017-08-23 ENCOUNTER — TELEPHONE (OUTPATIENT)
Dept: OBGYN UNIT | Facility: HOSPITAL | Age: 27
End: 2017-08-23

## 2017-08-28 ENCOUNTER — TELEPHONE (OUTPATIENT)
Dept: OBGYN CLINIC | Facility: CLINIC | Age: 27
End: 2017-08-28

## 2017-09-21 ENCOUNTER — OFFICE VISIT (OUTPATIENT)
Dept: OBGYN CLINIC | Facility: CLINIC | Age: 27
End: 2017-09-21

## 2017-09-21 VITALS
HEIGHT: 66 IN | BODY MASS INDEX: 26.52 KG/M2 | SYSTOLIC BLOOD PRESSURE: 118 MMHG | DIASTOLIC BLOOD PRESSURE: 70 MMHG | WEIGHT: 165 LBS

## 2017-09-21 RX ORDER — ACETAMINOPHEN AND CODEINE PHOSPHATE 120; 12 MG/5ML; MG/5ML
0.35 SOLUTION ORAL DAILY
Qty: 1 PACKAGE | Refills: 11 | Status: SHIPPED | OUTPATIENT
Start: 2017-09-21 | End: 2017-10-19

## 2017-09-21 NOTE — PROGRESS NOTES
PP  No C/O  Minimal bleeding    ROS: No Cardiac, Respiratory, GI,  or Neurological symptoms.     PE:  Abdomen soft  Pelvic:External vag normal, cervix without lesions, uterus anterior, normal size, adnexa negative    A/P: Normal exam  PAP normal 2017  cipriano

## 2017-10-10 ENCOUNTER — OFFICE VISIT (OUTPATIENT)
Dept: OBGYN CLINIC | Facility: CLINIC | Age: 27
End: 2017-10-10

## 2017-10-10 VITALS
DIASTOLIC BLOOD PRESSURE: 74 MMHG | HEIGHT: 65.5 IN | BODY MASS INDEX: 27.16 KG/M2 | WEIGHT: 165 LBS | HEART RATE: 80 BPM | SYSTOLIC BLOOD PRESSURE: 108 MMHG

## 2017-10-10 DIAGNOSIS — Z32.02 ENCOUNTER FOR PREGNANCY TEST, RESULT NEGATIVE: Primary | ICD-10-CM

## 2017-10-10 PROCEDURE — 81025 URINE PREGNANCY TEST: CPT | Performed by: NURSE PRACTITIONER

## 2017-10-10 PROCEDURE — 99213 OFFICE O/P EST LOW 20 MIN: CPT | Performed by: NURSE PRACTITIONER

## 2017-10-10 NOTE — PROGRESS NOTES
S:  Patient had an  17. She states she had regular bleeding for 2 weeks then spotted X 1 week. Bleeding ended 17. NO bleeding 17 through 10/3/17 then light bleeding for 4 days, none for a day, then started bleeding again 2 days ago.  She

## 2017-10-10 NOTE — PATIENT INSTRUCTIONS
Take pregnancy test 2 weeks from unprotected intercourse  Condom use now through first pill pack is complete  Call if bleeding continues over the next 3-4 days or becomes heavy

## 2018-03-26 ENCOUNTER — TELEPHONE (OUTPATIENT)
Dept: OBGYN CLINIC | Facility: CLINIC | Age: 28
End: 2018-03-26

## 2018-03-26 NOTE — TELEPHONE ENCOUNTER
Contacted patient. She states that she wants to stop breastfeeding now. Recommended to her a supportive, tight bra and avoidance of any stimulation. Also recommended that she contact lactation consultants and provided number. She states understanding.

## 2018-04-30 ENCOUNTER — TELEPHONE (OUTPATIENT)
Dept: OBGYN CLINIC | Facility: CLINIC | Age: 28
End: 2018-04-30

## 2018-04-30 NOTE — TELEPHONE ENCOUNTER
Pt calling and wants to discuss her birth control    Pt stopped nursing and wants to know if she needs to change    Please advise

## 2018-04-30 NOTE — TELEPHONE ENCOUNTER
33 y/o called because she stopped breast feeding completely. Patient wishes to stay on [facility]. Patient advised that this is a progesterone only birth control and often not used unless indicated or patient is breast feeding.  Patient states that she was prev

## 2018-05-24 ENCOUNTER — TELEPHONE (OUTPATIENT)
Dept: OBGYN CLINIC | Facility: CLINIC | Age: 28
End: 2018-05-24

## 2019-03-18 ENCOUNTER — OFFICE VISIT (OUTPATIENT)
Dept: OBGYN CLINIC | Facility: CLINIC | Age: 29
End: 2019-03-18
Payer: COMMERCIAL

## 2019-03-18 VITALS
BODY MASS INDEX: 27.82 KG/M2 | DIASTOLIC BLOOD PRESSURE: 70 MMHG | SYSTOLIC BLOOD PRESSURE: 116 MMHG | HEART RATE: 80 BPM | HEIGHT: 65.5 IN | WEIGHT: 169 LBS | RESPIRATION RATE: 16 BRPM

## 2019-03-18 DIAGNOSIS — Z01.419 ENCOUNTER FOR GYNECOLOGICAL EXAMINATION WITHOUT ABNORMAL FINDING: Primary | ICD-10-CM

## 2019-03-18 PROCEDURE — 99395 PREV VISIT EST AGE 18-39: CPT | Performed by: OBSTETRICS & GYNECOLOGY

## 2019-03-18 RX ORDER — DIPHENOXYLATE HYDROCHLORIDE AND ATROPINE SULFATE 2.5; .025 MG/1; MG/1
TABLET ORAL
COMMUNITY
End: 2019-06-19 | Stop reason: ALTCHOICE

## 2019-03-18 NOTE — PROGRESS NOTES
Patient ID:   Darylene Sia  is a 25year old female         HPI:     Here for general gyn exam. Last seen 2017 for AUB during postpartum period. New medical/surgical hx:  None.       Patient's last menstrual period was 2019 (exact d placed in this encounter. Meds & Refills for this Visit:  Requested Prescriptions      No prescriptions requested or ordered in this encounter       Imaging & Consults:  None    NOTES:  Names of RE given to pursue gender selection, if feasible.  See in

## 2019-06-10 ENCOUNTER — TELEPHONE (OUTPATIENT)
Dept: OBGYN CLINIC | Facility: CLINIC | Age: 29
End: 2019-06-10

## 2019-06-10 NOTE — TELEPHONE ENCOUNTER
Patient calling to initiate prenatal care  LMP 4/28/19  Patient is 7-8 weeks on   Confirmation Ultrasound and Appointment scheduled on   Future Appointments   Date Time Provider Anatoly Castañeda   6/19/2019 10:00 AM [facility] OB BRANDON MCCABE [facility] OB/[facility]N N [facility] [place]

## 2019-06-19 ENCOUNTER — ULTRASOUND ENCOUNTER (OUTPATIENT)
Dept: OBGYN CLINIC | Facility: CLINIC | Age: 29
End: 2019-06-19
Payer: COMMERCIAL

## 2019-06-19 ENCOUNTER — OFFICE VISIT (OUTPATIENT)
Dept: OBGYN CLINIC | Facility: CLINIC | Age: 29
End: 2019-06-19
Payer: COMMERCIAL

## 2019-06-19 VITALS
WEIGHT: 169 LBS | SYSTOLIC BLOOD PRESSURE: 106 MMHG | BODY MASS INDEX: 27.16 KG/M2 | HEIGHT: 66 IN | DIASTOLIC BLOOD PRESSURE: 66 MMHG

## 2019-06-19 DIAGNOSIS — N91.1 SECONDARY AMENORRHEA: Primary | ICD-10-CM

## 2019-06-19 DIAGNOSIS — Z32.01 PREGNANCY EXAMINATION OR TEST, POSITIVE RESULT: ICD-10-CM

## 2019-06-19 PROCEDURE — 76830 TRANSVAGINAL US NON-OB: CPT | Performed by: OBSTETRICS & GYNECOLOGY

## 2019-06-19 PROCEDURE — 99213 OFFICE O/P EST LOW 20 MIN: CPT | Performed by: OBSTETRICS & GYNECOLOGY

## 2019-06-19 PROCEDURE — 76856 US EXAM PELVIC COMPLETE: CPT | Performed by: OBSTETRICS & GYNECOLOGY

## 2019-06-19 NOTE — PROGRESS NOTES
[facility]  Obstetrics and Gynecology    Subjective:     Tran Proctor is a 25year old  female presents with c/o secondary amenorrhea and positive pregnancy test. The patient was recommended to return for further evaluation and a p

## 2019-07-04 ENCOUNTER — APPOINTMENT (OUTPATIENT)
Dept: ULTRASOUND IMAGING | Facility: HOSPITAL | Age: 29
End: 2019-07-04
Attending: EMERGENCY MEDICINE
Payer: COMMERCIAL

## 2019-07-04 ENCOUNTER — HOSPITAL ENCOUNTER (OUTPATIENT)
Age: 29
Discharge: ANOTHER HOSPITAL | End: 2019-07-04
Attending: FAMILY MEDICINE
Payer: COMMERCIAL

## 2019-07-04 ENCOUNTER — HOSPITAL ENCOUNTER (EMERGENCY)
Facility: HOSPITAL | Age: 29
Discharge: HOME | End: 2019-07-04
Attending: EMERGENCY MEDICINE
Payer: COMMERCIAL

## 2019-07-04 VITALS
BODY MASS INDEX: 27 KG/M2 | HEART RATE: 73 BPM | DIASTOLIC BLOOD PRESSURE: 74 MMHG | WEIGHT: 169.06 LBS | RESPIRATION RATE: 18 BRPM | TEMPERATURE: 99 F | SYSTOLIC BLOOD PRESSURE: 114 MMHG | OXYGEN SATURATION: 100 %

## 2019-07-04 VITALS
OXYGEN SATURATION: 97 % | DIASTOLIC BLOOD PRESSURE: 82 MMHG | SYSTOLIC BLOOD PRESSURE: 119 MMHG | TEMPERATURE: 99 F | RESPIRATION RATE: 14 BRPM | HEART RATE: 80 BPM

## 2019-07-04 DIAGNOSIS — O20.0 MISCARRIAGE, THREATENED, EARLY PREGNANCY: Primary | ICD-10-CM

## 2019-07-04 DIAGNOSIS — O20.9 VAGINAL BLEEDING IN PREGNANCY, FIRST TRIMESTER: Primary | ICD-10-CM

## 2019-07-04 LAB
B-HCG SERPL-ACNC: ABNORMAL MIU/ML
BASOPHILS # BLD AUTO: 0.01 X10(3) UL (ref 0–0.2)
BASOPHILS NFR BLD AUTO: 0.2 %
BILIRUB UR QL STRIP.AUTO: NEGATIVE
DEPRECATED RDW RBC AUTO: 44.6 FL (ref 35.1–46.3)
EOSINOPHIL # BLD AUTO: 0.07 X10(3) UL (ref 0–0.7)
EOSINOPHIL NFR BLD AUTO: 1.2 %
ERYTHROCYTE [DISTWIDTH] IN BLOOD BY AUTOMATED COUNT: 13 % (ref 11–15)
GLUCOSE UR STRIP.AUTO-MCNC: NEGATIVE MG/DL
HCT VFR BLD AUTO: 34.9 % (ref 35–48)
HGB BLD-MCNC: 11.6 G/DL (ref 12–16)
IMM GRANULOCYTES # BLD AUTO: 0.01 X10(3) UL (ref 0–1)
IMM GRANULOCYTES NFR BLD: 0.2 %
KETONES UR STRIP.AUTO-MCNC: NEGATIVE MG/DL
LEUKOCYTE ESTERASE UR QL STRIP.AUTO: NEGATIVE
LYMPHOCYTES # BLD AUTO: 1.24 X10(3) UL (ref 1–4)
LYMPHOCYTES NFR BLD AUTO: 21.7 %
MCH RBC QN AUTO: 31.1 PG (ref 26–34)
MCHC RBC AUTO-ENTMCNC: 33.2 G/DL (ref 31–37)
MCV RBC AUTO: 93.6 FL (ref 80–100)
MONOCYTES # BLD AUTO: 0.28 X10(3) UL (ref 0.1–1)
MONOCYTES NFR BLD AUTO: 4.9 %
NEUTROPHILS # BLD AUTO: 4.1 X10 (3) UL (ref 1.5–7.7)
NEUTROPHILS # BLD AUTO: 4.1 X10(3) UL (ref 1.5–7.7)
NEUTROPHILS NFR BLD AUTO: 71.8 %
NITRITE UR QL STRIP.AUTO: NEGATIVE
PH UR STRIP.AUTO: 6 [PH] (ref 4.5–8)
PLATELET # BLD AUTO: 152 10(3)UL (ref 150–450)
PROT UR STRIP.AUTO-MCNC: 30 MG/DL
RBC # BLD AUTO: 3.73 X10(6)UL (ref 3.8–5.3)
RBC #/AREA URNS AUTO: >10 /HPF
RH BLOOD TYPE: POSITIVE
SP GR UR STRIP.AUTO: 1.01 (ref 1–1.03)
UROBILINOGEN UR STRIP.AUTO-MCNC: <2 MG/DL
WBC # BLD AUTO: 5.7 X10(3) UL (ref 4–11)

## 2019-07-04 PROCEDURE — 99203 OFFICE O/P NEW LOW 30 MIN: CPT

## 2019-07-04 PROCEDURE — 86900 BLOOD TYPING SEROLOGIC ABO: CPT | Performed by: EMERGENCY MEDICINE

## 2019-07-04 PROCEDURE — 87086 URINE CULTURE/COLONY COUNT: CPT | Performed by: PHYSICIAN ASSISTANT

## 2019-07-04 PROCEDURE — 86901 BLOOD TYPING SEROLOGIC RH(D): CPT | Performed by: EMERGENCY MEDICINE

## 2019-07-04 PROCEDURE — 99212 OFFICE O/P EST SF 10 MIN: CPT

## 2019-07-04 PROCEDURE — 99284 EMERGENCY DEPT VISIT MOD MDM: CPT

## 2019-07-04 PROCEDURE — 81001 URINALYSIS AUTO W/SCOPE: CPT | Performed by: PHYSICIAN ASSISTANT

## 2019-07-04 PROCEDURE — 36415 COLL VENOUS BLD VENIPUNCTURE: CPT

## 2019-07-04 PROCEDURE — 76801 OB US < 14 WKS SINGLE FETUS: CPT | Performed by: EMERGENCY MEDICINE

## 2019-07-04 PROCEDURE — 76817 TRANSVAGINAL US OBSTETRIC: CPT | Performed by: EMERGENCY MEDICINE

## 2019-07-04 PROCEDURE — 85025 COMPLETE CBC W/AUTO DIFF WBC: CPT | Performed by: EMERGENCY MEDICINE

## 2019-07-04 PROCEDURE — 84702 CHORIONIC GONADOTROPIN TEST: CPT | Performed by: EMERGENCY MEDICINE

## 2019-07-04 NOTE — ED INITIAL ASSESSMENT (HPI)
8-9 weeks pregnant with vaginal bleeding. Started lighter 3 days ago, but heavier today. Denies cramping.   [id number].

## 2019-07-04 NOTE — ED PROVIDER NOTES
Patient Seen in:  [facility]    History   Patient presents with:  Eval-G (genital)    Stated Complaint: eval g     HPI  70-year-old female  3 para 2 live 2 presents with a history of vaginal bleeding, which started on motion. Neck supple. No thyromegaly present. Cardiovascular: Normal rate, regular rhythm, normal heart sounds and intact distal pulses. Pulmonary/Chest: Effort normal and breath sounds normal. No respiratory distress. Abdominal: Soft.  Bowel sounds ar

## 2019-07-04 NOTE — ED PROVIDER NOTES
According to the urgent care note, \"25year-old female  3 para 2 live 2 presents with a history of vaginal bleeding, which started on Monday with spotting, is okay for the next 2 days and today started noticing increasing bleeding per vaginal with

## 2019-07-04 NOTE — ED NOTES
Pt states needing to urinate. Called US. ETA 5 minutes. Pads placed under pt incase some urine comes out. Notified that if pt urinates she may need to wait another 1hr for US. Pt verbalize understanding. Pt uncomfortable. Taking pt to [address].

## 2019-07-04 NOTE — ED INITIAL ASSESSMENT (HPI)
Pt presents to ER with bleeding since Monday. Pt states originally it was light, but has been getting heavier. No abd pain. Pt is speaking clearly. Skin warm and dry. Respirations equal and nonlabored. Pt is a&ox3. No fever.  Yes nausea, yes vomiting, no di

## 2019-07-04 NOTE — ED PROVIDER NOTES
Patient Seen in: [facility] Emergency Department    History   Patient presents with:  Eval-G (genital)  Pregnancy Issues (gynecologic)    Stated Complaint: vag bleeding    HPI    CHIEF COMPLAINT: Vaginal bleeding and pregnancy    HISTORY OF PRESENT 1624 Temporal   SpO2 07/04/19 1822 100 %   O2 Device 07/04/19 1822 None (Room air)       Current:/74   Pulse 73   Temp 99 °F (37.2 °C) (Temporal)   Resp 18   Wt 76.7 kg   LMP 04/28/2019   SpO2 100%   BMI 27.29 kg/m²         Physical Exam    Nursing n Abnormal            Final result                 Please view results for these tests on the individual orders.    ABORH (BLOOD TYPE)   RAINBOW DRAW BLUE   RAINBOW DRAW LAVENDER   RAINBOW DRAW LIGHT GREEN   RAINBOW DRAW GOLD   URINE CULTURE, ROUTINE   Patien medical condition was not or was no longer present. There was no indication for further evaluation, treatment or admission on an emergency basis.   Comprehensive verbal and written discharge and follow-up instructions were provided to help prevent relapse

## 2019-07-05 ENCOUNTER — TELEPHONE (OUTPATIENT)
Dept: OBGYN CLINIC | Facility: CLINIC | Age: 29
End: 2019-07-05

## 2019-07-05 DIAGNOSIS — O03.9 SAB (SPONTANEOUS ABORTION): Primary | ICD-10-CM

## 2019-07-05 NOTE — TELEPHONE ENCOUNTER
25year old patient seen in the ER yesterday for possible SAB.   No FHT on ultrasound, patient is bleeding like a period with cramping that started on Monday and increased 07/04/2019  Last OV date: 06/19/19  Recent Test/Labs: HCG and  Blood type (B+)  Dom

## 2019-07-06 ENCOUNTER — HOSPITAL ENCOUNTER (EMERGENCY)
Facility: HOSPITAL | Age: 29
Discharge: HOME | End: 2019-07-06
Attending: EMERGENCY MEDICINE
Payer: COMMERCIAL

## 2019-07-06 ENCOUNTER — APPOINTMENT (OUTPATIENT)
Dept: ULTRASOUND IMAGING | Facility: HOSPITAL | Age: 29
End: 2019-07-06
Attending: EMERGENCY MEDICINE
Payer: COMMERCIAL

## 2019-07-06 VITALS
RESPIRATION RATE: 16 BRPM | DIASTOLIC BLOOD PRESSURE: 84 MMHG | HEART RATE: 89 BPM | BODY MASS INDEX: 27.17 KG/M2 | TEMPERATURE: 98 F | HEIGHT: 66 IN | OXYGEN SATURATION: 98 % | WEIGHT: 169.06 LBS | SYSTOLIC BLOOD PRESSURE: 129 MMHG

## 2019-07-06 DIAGNOSIS — O03.9 SPONTANEOUS MISCARRIAGE: Primary | ICD-10-CM

## 2019-07-06 LAB
ALBUMIN SERPL-MCNC: 3.8 G/DL (ref 3.4–5)
ALBUMIN/GLOB SERPL: 1 {RATIO} (ref 1–2)
ALP LIVER SERPL-CCNC: 40 U/L (ref 37–98)
ALT SERPL-CCNC: 26 U/L (ref 13–56)
ANION GAP SERPL CALC-SCNC: 6 MMOL/L (ref 0–18)
AST SERPL-CCNC: 19 U/L (ref 15–37)
B-HCG SERPL-ACNC: 3654 MIU/ML
BASOPHILS # BLD AUTO: 0.01 X10(3) UL (ref 0–0.2)
BASOPHILS NFR BLD AUTO: 0.1 %
BILIRUB SERPL-MCNC: 0.7 MG/DL (ref 0.1–2)
BUN BLD-MCNC: 7 MG/DL (ref 7–18)
BUN/CREAT SERPL: 9.9 (ref 10–20)
CALCIUM BLD-MCNC: 9.3 MG/DL (ref 8.5–10.1)
CHLORIDE SERPL-SCNC: 108 MMOL/L (ref 98–112)
CO2 SERPL-SCNC: 24 MMOL/L (ref 21–32)
CREAT BLD-MCNC: 0.71 MG/DL (ref 0.55–1.02)
DEPRECATED RDW RBC AUTO: 44.8 FL (ref 35.1–46.3)
EOSINOPHIL # BLD AUTO: 0.04 X10(3) UL (ref 0–0.7)
EOSINOPHIL NFR BLD AUTO: 0.5 %
ERYTHROCYTE [DISTWIDTH] IN BLOOD BY AUTOMATED COUNT: 12.9 % (ref 11–15)
GLOBULIN PLAS-MCNC: 4 G/DL (ref 2.8–4.4)
GLUCOSE BLD-MCNC: 93 MG/DL (ref 70–99)
HCT VFR BLD AUTO: 35.2 % (ref 35–48)
HGB BLD-MCNC: 11.6 G/DL (ref 12–16)
IMM GRANULOCYTES # BLD AUTO: 0.04 X10(3) UL (ref 0–1)
IMM GRANULOCYTES NFR BLD: 0.5 %
LYMPHOCYTES # BLD AUTO: 0.59 X10(3) UL (ref 1–4)
LYMPHOCYTES NFR BLD AUTO: 8.1 %
M PROTEIN MFR SERPL ELPH: 7.8 G/DL (ref 6.4–8.2)
MCH RBC QN AUTO: 31.2 PG (ref 26–34)
MCHC RBC AUTO-ENTMCNC: 33 G/DL (ref 31–37)
MCV RBC AUTO: 94.6 FL (ref 80–100)
MONOCYTES # BLD AUTO: 0.32 X10(3) UL (ref 0.1–1)
MONOCYTES NFR BLD AUTO: 4.4 %
NEUTROPHILS # BLD AUTO: 6.29 X10 (3) UL (ref 1.5–7.7)
NEUTROPHILS # BLD AUTO: 6.29 X10(3) UL (ref 1.5–7.7)
NEUTROPHILS NFR BLD AUTO: 86.4 %
OSMOLALITY SERPL CALC.SUM OF ELEC: 284 MOSM/KG (ref 275–295)
PLATELET # BLD AUTO: 143 10(3)UL (ref 150–450)
POTASSIUM SERPL-SCNC: 3.5 MMOL/L (ref 3.5–5.1)
RBC # BLD AUTO: 3.72 X10(6)UL (ref 3.8–5.3)
SODIUM SERPL-SCNC: 138 MMOL/L (ref 136–145)
WBC # BLD AUTO: 7.3 X10(3) UL (ref 4–11)

## 2019-07-06 PROCEDURE — 99285 EMERGENCY DEPT VISIT HI MDM: CPT

## 2019-07-06 PROCEDURE — 96361 HYDRATE IV INFUSION ADD-ON: CPT

## 2019-07-06 PROCEDURE — 80053 COMPREHEN METABOLIC PANEL: CPT | Performed by: EMERGENCY MEDICINE

## 2019-07-06 PROCEDURE — 85025 COMPLETE CBC W/AUTO DIFF WBC: CPT | Performed by: EMERGENCY MEDICINE

## 2019-07-06 PROCEDURE — 84702 CHORIONIC GONADOTROPIN TEST: CPT | Performed by: EMERGENCY MEDICINE

## 2019-07-06 PROCEDURE — 76801 OB US < 14 WKS SINGLE FETUS: CPT | Performed by: EMERGENCY MEDICINE

## 2019-07-06 PROCEDURE — 99284 EMERGENCY DEPT VISIT MOD MDM: CPT

## 2019-07-06 PROCEDURE — 96374 THER/PROPH/DIAG INJ IV PUSH: CPT

## 2019-07-06 PROCEDURE — 76817 TRANSVAGINAL US OBSTETRIC: CPT | Performed by: EMERGENCY MEDICINE

## 2019-07-06 RX ORDER — HYDROCODONE BITARTRATE AND ACETAMINOPHEN 5; 325 MG/1; MG/1
1-2 TABLET ORAL EVERY 6 HOURS PRN
Qty: 10 TABLET | Refills: 0 | Status: SHIPPED | OUTPATIENT
Start: 2019-07-06 | End: 2019-07-13

## 2019-07-06 RX ORDER — ACETAMINOPHEN 500 MG
1000 TABLET ORAL EVERY 6 HOURS PRN
COMMUNITY
End: 2019-12-06

## 2019-07-06 RX ORDER — MORPHINE SULFATE 4 MG/ML
4 INJECTION, SOLUTION INTRAMUSCULAR; INTRAVENOUS ONCE
Status: COMPLETED | OUTPATIENT
Start: 2019-07-06 | End: 2019-07-06

## 2019-07-06 NOTE — ED PROVIDER NOTES
Patient Seen in: [facility] Emergency Department    History   Patient presents with:  Eval-G (genital): POST MISCARRAGE BLEEDING AND PAIN    Stated Complaint: EVAL G    HPI    Patient presents with increasing vaginal bleeding along with suprapubic d vaginal bleeding were noted. There is no cervical motion tenderness. Cervix is long thick and closed. No adnexal tenderness or masses noted. Patient's fundus is somewhat tender on bimanual exam.   Musculoskeletal: Normal range of motion.  She exhibits no QUANTITATIVE(!): 3,654.0  Comment: Down from 66374.  ------------------------------------------------------------  Time: 07/06 1246  Value: US OB W/ EV 1ST TRIMESTER (CPT=76801/59829) Once  Comment: No  IUP     Patient was given morphine for pain with impr

## 2019-07-06 NOTE — ED NOTES
Pt awake and alert, skin w/d,resps reg/unlabored. Pt appears comfortable, states she is feeling better. Pain now 4/10.

## 2019-07-06 NOTE — ED INITIAL ASSESSMENT (HPI)
Pt presents to the ED with complaints of abdominal cramping. Pt was seen here 2 days ago and told she was miscarrying. Pt spoke to her OB yesterday and has follow up appointment on Monday.  Pt states bleeding has not worsened but c/o increase in cramping, p

## 2019-07-06 NOTE — ED NOTES
Pt appears more comfortable on cart, states she is feeling a little better. Family at bedside. Fluids infusing wide per order. Pt aware that when she feels like she has to urinate she should inform staff so that she can be made ready for [address].

## 2019-07-07 ENCOUNTER — TELEPHONE (OUTPATIENT)
Dept: OBGYN UNIT | Facility: HOSPITAL | Age: 29
End: 2019-07-07

## 2019-07-08 ENCOUNTER — OFFICE VISIT (OUTPATIENT)
Dept: OBGYN CLINIC | Facility: CLINIC | Age: 29
End: 2019-07-08
Payer: COMMERCIAL

## 2019-07-08 VITALS
WEIGHT: 171 LBS | HEIGHT: 66 IN | SYSTOLIC BLOOD PRESSURE: 124 MMHG | DIASTOLIC BLOOD PRESSURE: 66 MMHG | BODY MASS INDEX: 27.48 KG/M2

## 2019-07-08 DIAGNOSIS — O03.9 COMPLETE MISCARRIAGE: Primary | ICD-10-CM

## 2019-07-08 PROCEDURE — 99213 OFFICE O/P EST LOW 20 MIN: CPT | Performed by: OBSTETRICS & GYNECOLOGY

## 2019-07-08 NOTE — PROGRESS NOTES
CC: Patient presents with:   Other: possible DOVE, PT states bleeding and mild cramping today       HPI: Mitul Mchugh is a 25year old  here for follow up after ED visit on  and   Had no fetal heart beat on  after presenting for blee closed. Some blood clots in the vagina. Uterus normal. Non tender      Neurological: She is alert and oriented to person, place, and time. Skin: Skin is warm. Psychiatric: She has a normal mood and affect.          Assessment/Plan:    Problem List Ite

## 2019-11-19 ENCOUNTER — TELEPHONE (OUTPATIENT)
Dept: OBGYN CLINIC | Facility: CLINIC | Age: 29
End: 2019-11-19

## 2019-11-19 DIAGNOSIS — O09.299 HISTORY OF MISCARRIAGE, CURRENTLY PREGNANT: Primary | ICD-10-CM

## 2019-11-19 NOTE — TELEPHONE ENCOUNTER
Patient calling to initiate prenatal care  LMP 9/27  Patient is 7-8 weeks 11/14-11/22  Confirmation Ultrasound and Appointment scheduled on   Future Appointments   Date Time Provider Anatoly Castañeda   12/6/2019 12:15 PM [facility] OB PFLD US [facility] OB/GYN P [facility] 12

## 2019-11-19 NOTE — TELEPHONE ENCOUNTER
LMP: 19  EDC based on lmp: 7/3/20    8 wks on ; appt on  at 10 wks        Are cycles regular?: yes    Medical problems: denies    Past sx hx: denies    Current medications: PNV with DHA    Past pregnancy complications: term  in 20

## 2019-11-20 ENCOUNTER — APPOINTMENT (OUTPATIENT)
Dept: LAB | Age: 29
End: 2019-11-20
Attending: OBSTETRICS & GYNECOLOGY
Payer: COMMERCIAL

## 2019-11-20 DIAGNOSIS — O09.299 HISTORY OF MISCARRIAGE, CURRENTLY PREGNANT: ICD-10-CM

## 2019-11-20 PROCEDURE — 84702 CHORIONIC GONADOTROPIN TEST: CPT

## 2019-11-20 PROCEDURE — 84144 ASSAY OF PROGESTERONE: CPT

## 2019-11-20 PROCEDURE — 36415 COLL VENOUS BLD VENIPUNCTURE: CPT

## 2019-11-22 ENCOUNTER — LAB ENCOUNTER (OUTPATIENT)
Dept: LAB | Age: 29
End: 2019-11-22
Attending: OBSTETRICS & GYNECOLOGY
Payer: COMMERCIAL

## 2019-11-22 DIAGNOSIS — O09.299 HISTORY OF MISCARRIAGE, CURRENTLY PREGNANT: ICD-10-CM

## 2019-11-22 PROCEDURE — 84702 CHORIONIC GONADOTROPIN TEST: CPT

## 2019-11-22 PROCEDURE — 36415 COLL VENOUS BLD VENIPUNCTURE: CPT

## 2019-11-22 NOTE — TELEPHONE ENCOUNTER
Pt called, she just left the lab and no progesterone lab ordered. Lab mary ellen extra tube, please place order if that test is needed. Contact pt if not.

## 2019-11-23 ENCOUNTER — OFFICE VISIT (OUTPATIENT)
Dept: OBGYN CLINIC | Facility: CLINIC | Age: 29
End: 2019-11-23
Payer: COMMERCIAL

## 2019-11-23 VITALS
WEIGHT: 170 LBS | BODY MASS INDEX: 27.32 KG/M2 | DIASTOLIC BLOOD PRESSURE: 68 MMHG | SYSTOLIC BLOOD PRESSURE: 122 MMHG | HEIGHT: 66 IN

## 2019-11-23 DIAGNOSIS — O02.81: Primary | ICD-10-CM

## 2019-11-23 PROCEDURE — 76856 US EXAM PELVIC COMPLETE: CPT | Performed by: OBSTETRICS & GYNECOLOGY

## 2019-11-23 PROCEDURE — 99214 OFFICE O/P EST MOD 30 MIN: CPT | Performed by: OBSTETRICS & GYNECOLOGY

## 2019-11-23 NOTE — PROGRESS NOTES
OB/GYN H&P     11/23/2019  12:59 PM    CC: Patient presents with: Other: lab review      HPI: Safia Guadarrama is a 30year old [id number] here for follow-up on lab results.   Patient last menstrual period was 9/27/2019 and she has had hCG values drawn on Pertinent positives and negatives noted in the the HPI. Objective:    /68   Ht 66\"   Wt 170 lb (77.1 kg)   LMP 09/27/2019 (Exact Date)   BMI 27.44 kg/m²   Physical Exam    Constitutional: She is oriented to person, place, and time.  She appears well

## 2019-11-25 ENCOUNTER — LAB ENCOUNTER (OUTPATIENT)
Dept: LAB | Age: 29
End: 2019-11-25
Attending: OBSTETRICS & GYNECOLOGY
Payer: COMMERCIAL

## 2019-11-25 DIAGNOSIS — O02.81: ICD-10-CM

## 2019-11-25 PROCEDURE — 84702 CHORIONIC GONADOTROPIN TEST: CPT

## 2019-11-25 PROCEDURE — 36415 COLL VENOUS BLD VENIPUNCTURE: CPT

## 2019-11-27 ENCOUNTER — TELEPHONE (OUTPATIENT)
Dept: OBGYN CLINIC | Facility: CLINIC | Age: 29
End: 2019-11-27

## 2019-11-27 NOTE — TELEPHONE ENCOUNTER
Per Dr. Anders Rhodes note patient to keep appointment for 12/06 and report any bleeding to our office. Patient informed. Verbalized understanding. No further questions or concerns.

## 2019-12-06 ENCOUNTER — OFFICE VISIT (OUTPATIENT)
Dept: OBGYN CLINIC | Facility: CLINIC | Age: 29
End: 2019-12-06
Payer: COMMERCIAL

## 2019-12-06 ENCOUNTER — ULTRASOUND ENCOUNTER (OUTPATIENT)
Dept: OBGYN CLINIC | Facility: CLINIC | Age: 29
End: 2019-12-06
Payer: COMMERCIAL

## 2019-12-06 VITALS
SYSTOLIC BLOOD PRESSURE: 112 MMHG | WEIGHT: 170.19 LBS | DIASTOLIC BLOOD PRESSURE: 58 MMHG | BODY MASS INDEX: 27.35 KG/M2 | HEIGHT: 66 IN | HEART RATE: 80 BPM

## 2019-12-06 DIAGNOSIS — N91.1 SECONDARY AMENORRHEA: Primary | ICD-10-CM

## 2019-12-06 DIAGNOSIS — O02.81: ICD-10-CM

## 2019-12-06 DIAGNOSIS — O09.299 HISTORY OF MISCARRIAGE, CURRENTLY PREGNANT: ICD-10-CM

## 2019-12-06 PROCEDURE — 99213 OFFICE O/P EST LOW 20 MIN: CPT | Performed by: OBSTETRICS & GYNECOLOGY

## 2019-12-06 PROCEDURE — 76856 US EXAM PELVIC COMPLETE: CPT | Performed by: OBSTETRICS & GYNECOLOGY

## 2019-12-06 NOTE — PATIENT INSTRUCTIONS
Medications Safe in Pregnancy  The following over-the-counter medications may be taken safely after 12 weeks gestation by any patient who is pregnant. Please follow the instructions on the package for adult dosage.   If you experience any symptoms tyson

## 2019-12-06 NOTE — PROGRESS NOTES
[address] [facility]  Obstetrics and Gynecology    Subjective:     Panchito Navarrete is a 30year old  female presents with c/o secondary amenorrhea and positive pregnancy test. The patient was recommended to return for further evaluation.  The pa findings and plan were discussed with the patient. She notes understanding and agrees with the plan of care. All questions were answered to the best of my ability at this time.     RTC in 2-3 weeks or sooner if needed

## 2019-12-06 NOTE — PROGRESS NOTES
Confirmation of pregnancy  Trans abdominal  lmp 09/27/19, ega 10weeks 0days edc 07/03/20    Saravia viable iup at 10w 3d  Nl cervix, yolk sac, adnexa  Left ovarian cyst 3.26 x 3.17 cm    EGA 10W 0D WITH [facility] 07/03/20

## 2020-01-06 ENCOUNTER — INITIAL PRENATAL (OUTPATIENT)
Dept: OBGYN CLINIC | Facility: CLINIC | Age: 30
End: 2020-01-06
Payer: COMMERCIAL

## 2020-01-06 VITALS
WEIGHT: 176.19 LBS | HEART RATE: 85 BPM | BODY MASS INDEX: 28.32 KG/M2 | SYSTOLIC BLOOD PRESSURE: 122 MMHG | HEIGHT: 66 IN | DIASTOLIC BLOOD PRESSURE: 60 MMHG

## 2020-01-06 DIAGNOSIS — Z34.92 ENCOUNTER FOR SUPERVISION OF NORMAL PREGNANCY IN SECOND TRIMESTER, UNSPECIFIED GRAVIDITY: Primary | ICD-10-CM

## 2020-01-06 DIAGNOSIS — Z12.4 CERVICAL CANCER SCREENING: ICD-10-CM

## 2020-01-06 PROBLEM — Z34.91 ENCOUNTER FOR SUPERVISION OF NORMAL PREGNANCY IN FIRST TRIMESTER: Status: ACTIVE | Noted: 2020-01-06

## 2020-01-06 PROBLEM — Z34.91 ENCOUNTER FOR SUPERVISION OF NORMAL PREGNANCY IN FIRST TRIMESTER (HCC): Status: ACTIVE | Noted: 2020-01-06

## 2020-01-06 LAB
BASOPHILS # BLD AUTO: 0.01 X10(3) UL (ref 0–0.2)
BASOPHILS NFR BLD AUTO: 0.2 %
DEPRECATED RDW RBC AUTO: 47.2 FL (ref 35.1–46.3)
EOSINOPHIL # BLD AUTO: 0.04 X10(3) UL (ref 0–0.7)
EOSINOPHIL NFR BLD AUTO: 0.7 %
ERYTHROCYTE [DISTWIDTH] IN BLOOD BY AUTOMATED COUNT: 13.4 % (ref 11–15)
HBV SURFACE AG SER-ACNC: 0.2 [IU]/L
HBV SURFACE AG SERPL QL IA: NONREACTIVE
HCT VFR BLD AUTO: 35.7 % (ref 35–48)
HGB BLD-MCNC: 11.7 G/DL (ref 12–16)
IMM GRANULOCYTES # BLD AUTO: 0.01 X10(3) UL (ref 0–1)
IMM GRANULOCYTES NFR BLD: 0.2 %
LYMPHOCYTES # BLD AUTO: 1.22 X10(3) UL (ref 1–4)
LYMPHOCYTES NFR BLD AUTO: 22.2 %
MCH RBC QN AUTO: 31.3 PG (ref 26–34)
MCHC RBC AUTO-ENTMCNC: 32.8 G/DL (ref 31–37)
MCV RBC AUTO: 95.5 FL (ref 80–100)
MONOCYTES # BLD AUTO: 0.4 X10(3) UL (ref 0.1–1)
MONOCYTES NFR BLD AUTO: 7.3 %
NEUTROPHILS # BLD AUTO: 3.81 X10 (3) UL (ref 1.5–7.7)
NEUTROPHILS # BLD AUTO: 3.81 X10(3) UL (ref 1.5–7.7)
NEUTROPHILS NFR BLD AUTO: 69.4 %
PLATELET # BLD AUTO: 160 10(3)UL (ref 150–450)
RBC # BLD AUTO: 3.74 X10(6)UL (ref 3.8–5.3)
RH BLOOD TYPE: POSITIVE
RUBV IGG SER QL: POSITIVE
RUBV IGG SER-ACNC: >500 IU/ML (ref 10–?)
T PALLIDUM AB SER QL IA: NONREACTIVE
WBC # BLD AUTO: 5.5 X10(3) UL (ref 4–11)

## 2020-01-06 PROCEDURE — 85025 COMPLETE CBC W/AUTO DIFF WBC: CPT | Performed by: OBSTETRICS & GYNECOLOGY

## 2020-01-06 PROCEDURE — 87086 URINE CULTURE/COLONY COUNT: CPT | Performed by: OBSTETRICS & GYNECOLOGY

## 2020-01-06 PROCEDURE — 87491 CHLMYD TRACH DNA AMP PROBE: CPT | Performed by: OBSTETRICS & GYNECOLOGY

## 2020-01-06 PROCEDURE — 86900 BLOOD TYPING SEROLOGIC ABO: CPT | Performed by: OBSTETRICS & GYNECOLOGY

## 2020-01-06 PROCEDURE — 86901 BLOOD TYPING SEROLOGIC RH(D): CPT | Performed by: OBSTETRICS & GYNECOLOGY

## 2020-01-06 PROCEDURE — 87340 HEPATITIS B SURFACE AG IA: CPT | Performed by: OBSTETRICS & GYNECOLOGY

## 2020-01-06 PROCEDURE — 86780 TREPONEMA PALLIDUM: CPT | Performed by: OBSTETRICS & GYNECOLOGY

## 2020-01-06 PROCEDURE — 86850 RBC ANTIBODY SCREEN: CPT | Performed by: OBSTETRICS & GYNECOLOGY

## 2020-01-06 PROCEDURE — 87389 HIV-1 AG W/HIV-1&-2 AB AG IA: CPT | Performed by: OBSTETRICS & GYNECOLOGY

## 2020-01-06 PROCEDURE — 87591 N.GONORRHOEAE DNA AMP PROB: CPT | Performed by: OBSTETRICS & GYNECOLOGY

## 2020-01-06 PROCEDURE — 87624 HPV HI-RISK TYP POOLED RSLT: CPT | Performed by: OBSTETRICS & GYNECOLOGY

## 2020-01-06 PROCEDURE — 86762 RUBELLA ANTIBODY: CPT | Performed by: OBSTETRICS & GYNECOLOGY

## 2020-01-06 NOTE — PROGRESS NOTES
[address] [facility]  Obstetrics and Gynecology  History & Physical    CC: Patient is here to establish prenatal care     Subjective:     HPI:  Vivian Monge is a 30year old  female at 14w3d who presents today to establish prenatal care.  Delilah Merrill Heart Disease Neg    • Stroke Neg        SocialHx:      Social History    Tobacco Use      Smoking status: Never Smoker      Smokeless tobacco: Never Used    Alcohol use: Not Currently    Drug use: No      Comment: rare before pregnancy       Marital statu to establish prenatal care.     Patient Active Problem List:     Encounter for supervision of normal pregnancy in second trimester        Prenatal care  - prenatal labs ordered  - Pap: collected and ordered   - Cervical cultures: GC, Tigre collected and order

## 2020-01-06 NOTE — PATIENT INSTRUCTIONS
Genetic screening     1) Second trimester screening (Quad screen)  - Performed at 16 - 20 week includes blood test    - Screens for Trisomy 13, 18 and 21 (Down Syndrome)   - Also screens for Neural Tube Defects (fetal spine)   - Requires lab order prior to Contact the office if you have diarrhea for more than 3 days. Allergies: Benadryl, Claritin or Zyrtec    Hemorrhoids: Tucks pads, Preparation H, Annusol, Sitz bath or Witch hazel  Eat a high fiber diet and drink plenty of fluids.     Yeast: Monistat 3 or ? Fish- avoid fish that contains a high level of mercury. These include but are not limited to; Marita Hilton, Tile Fish, Long Cazares, and [facility]. Please see chart below for good fish choices in pregnancy.  Also avoid any raw, uncooked s

## 2020-01-07 LAB
ANTIBODY SCREEN: NEGATIVE
C TRACH DNA SPEC QL NAA+PROBE: NEGATIVE
N GONORRHOEA DNA SPEC QL NAA+PROBE: NEGATIVE

## 2020-01-09 LAB
HPV I/H RISK 1 DNA SPEC QL NAA+PROBE: NEGATIVE
LAST PAP RESULT: NORMAL
PAP HISTORY (OTHER THAN LAST PAP): NORMAL

## 2020-01-09 NOTE — PROGRESS NOTES
Pap smear normal and negative for HPV. Repeat pap smear in 5 years per ASCCP guidelines. Continue annual gynecologic exams.

## 2020-02-03 ENCOUNTER — ROUTINE PRENATAL (OUTPATIENT)
Dept: OBGYN CLINIC | Facility: CLINIC | Age: 30
End: 2020-02-03
Payer: COMMERCIAL

## 2020-02-03 VITALS
WEIGHT: 180 LBS | BODY MASS INDEX: 28.93 KG/M2 | HEART RATE: 89 BPM | DIASTOLIC BLOOD PRESSURE: 64 MMHG | SYSTOLIC BLOOD PRESSURE: 122 MMHG | HEIGHT: 66 IN

## 2020-02-03 DIAGNOSIS — Z36.9 PRENATAL SCREENING ENCOUNTER: Primary | ICD-10-CM

## 2020-02-03 DIAGNOSIS — Z34.82 ENCOUNTER FOR SUPERVISION OF OTHER NORMAL PREGNANCY IN SECOND TRIMESTER: ICD-10-CM

## 2020-02-03 LAB
GLUCOSE (URINE DIPSTICK): NEGATIVE MG/DL
MULTISTIX LOT#: NORMAL NUMERIC
PROTEIN (URINE DIPSTICK): NEGATIVE MG/DL

## 2020-02-03 PROCEDURE — 81002 URINALYSIS NONAUTO W/O SCOPE: CPT | Performed by: NURSE PRACTITIONER

## 2020-02-03 NOTE — PROGRESS NOTES
NERI CHOU possible. Doing well but notes nausea over the last week- after meals- only lunch and dinner  I recommend she try TUMS to see if it is from acid production. If it helps she can try Pepcid preventatively.  If no help she is to call and I can call in

## 2020-02-17 ENCOUNTER — ROUTINE PRENATAL (OUTPATIENT)
Dept: OBGYN CLINIC | Facility: CLINIC | Age: 30
End: 2020-02-17
Payer: COMMERCIAL

## 2020-02-17 ENCOUNTER — ULTRASOUND ENCOUNTER (OUTPATIENT)
Dept: OBGYN CLINIC | Facility: CLINIC | Age: 30
End: 2020-02-17
Payer: COMMERCIAL

## 2020-02-17 VITALS
WEIGHT: 184.81 LBS | HEIGHT: 66 IN | BODY MASS INDEX: 29.7 KG/M2 | DIASTOLIC BLOOD PRESSURE: 70 MMHG | SYSTOLIC BLOOD PRESSURE: 118 MMHG

## 2020-02-17 DIAGNOSIS — Z34.92 ENCOUNTER FOR SUPERVISION OF NORMAL PREGNANCY IN SECOND TRIMESTER, UNSPECIFIED GRAVIDITY: Primary | ICD-10-CM

## 2020-02-17 DIAGNOSIS — Z36.9 PRENATAL SCREENING ENCOUNTER: ICD-10-CM

## 2020-02-17 LAB — MULTISTIX LOT#: NORMAL NUMERIC

## 2020-02-17 PROCEDURE — 81002 URINALYSIS NONAUTO W/O SCOPE: CPT | Performed by: OBSTETRICS & GYNECOLOGY

## 2020-02-17 PROCEDURE — 76805 OB US >/= 14 WKS SNGL FETUS: CPT | Performed by: OBSTETRICS & GYNECOLOGY

## 2020-02-17 NOTE — PATIENT INSTRUCTIONS
Please complete your 1 hour glucose tolerance test to check for diabetes in pregnancy and repeat CBC to check for anemia at 24 to 28 weeks of gestation. You do not have to be fasting but please avoid high sugar/carb intake immediately before lab testing. come   back. Medications Safe in Pregnancy  The following over-the-counter medications may be taken safely after 12 weeks gestation by any patient who is pregnant. Please follow the instructions on the package for adult dosage.   If you experience any

## 2020-02-17 NOTE — PROGRESS NOTES
NERI  Doing well  No complaints.  Denies LOF/VB/uctx  RH positive  Genetic testing declined, states she does not want to complete   Anatomy Scan 02/17/2020 unremarkable   CBC and 1 hr GTT order and instructions provided    RTC in 4 wks

## 2020-03-16 ENCOUNTER — ROUTINE PRENATAL (OUTPATIENT)
Dept: OBGYN CLINIC | Facility: CLINIC | Age: 30
End: 2020-03-16
Payer: COMMERCIAL

## 2020-03-16 ENCOUNTER — LAB ENCOUNTER (OUTPATIENT)
Dept: LAB | Age: 30
End: 2020-03-16
Attending: OBSTETRICS & GYNECOLOGY
Payer: COMMERCIAL

## 2020-03-16 VITALS — SYSTOLIC BLOOD PRESSURE: 120 MMHG | WEIGHT: 186.38 LBS | BODY MASS INDEX: 30 KG/M2 | DIASTOLIC BLOOD PRESSURE: 70 MMHG

## 2020-03-16 DIAGNOSIS — Z36.9 PRENATAL SCREENING ENCOUNTER: Primary | ICD-10-CM

## 2020-03-16 DIAGNOSIS — Z34.92 ENCOUNTER FOR SUPERVISION OF NORMAL PREGNANCY IN SECOND TRIMESTER, UNSPECIFIED GRAVIDITY: ICD-10-CM

## 2020-03-16 LAB
BASOPHILS # BLD AUTO: 0.01 X10(3) UL (ref 0–0.2)
BASOPHILS NFR BLD AUTO: 0.2 %
DEPRECATED RDW RBC AUTO: 46.5 FL (ref 35.1–46.3)
EOSINOPHIL # BLD AUTO: 0.04 X10(3) UL (ref 0–0.7)
EOSINOPHIL NFR BLD AUTO: 0.6 %
ERYTHROCYTE [DISTWIDTH] IN BLOOD BY AUTOMATED COUNT: 13 % (ref 11–15)
GLUCOSE (URINE DIPSTICK): NEGATIVE MG/DL
GLUCOSE 1H P GLC SERPL-MCNC: 92 MG/DL
HCT VFR BLD AUTO: 35.7 % (ref 35–48)
HGB BLD-MCNC: 11.5 G/DL (ref 12–16)
IMM GRANULOCYTES # BLD AUTO: 0.02 X10(3) UL (ref 0–1)
IMM GRANULOCYTES NFR BLD: 0.3 %
LYMPHOCYTES # BLD AUTO: 1.15 X10(3) UL (ref 1–4)
LYMPHOCYTES NFR BLD AUTO: 17.5 %
MCH RBC QN AUTO: 31.7 PG (ref 26–34)
MCHC RBC AUTO-ENTMCNC: 32.2 G/DL (ref 31–37)
MCV RBC AUTO: 98.3 FL (ref 80–100)
MONOCYTES # BLD AUTO: 0.4 X10(3) UL (ref 0.1–1)
MONOCYTES NFR BLD AUTO: 6.1 %
MULTISTIX LOT#: NORMAL NUMERIC
NEUTROPHILS # BLD AUTO: 4.96 X10 (3) UL (ref 1.5–7.7)
NEUTROPHILS # BLD AUTO: 4.96 X10(3) UL (ref 1.5–7.7)
NEUTROPHILS NFR BLD AUTO: 75.3 %
PLATELET # BLD AUTO: 143 10(3)UL (ref 150–450)
PROTEIN (URINE DIPSTICK): NEGATIVE MG/DL
RBC # BLD AUTO: 3.63 X10(6)UL (ref 3.8–5.3)
WBC # BLD AUTO: 6.6 X10(3) UL (ref 4–11)

## 2020-03-16 PROCEDURE — 82950 GLUCOSE TEST: CPT | Performed by: OBSTETRICS & GYNECOLOGY

## 2020-03-16 PROCEDURE — 85025 COMPLETE CBC W/AUTO DIFF WBC: CPT | Performed by: OBSTETRICS & GYNECOLOGY

## 2020-03-16 PROCEDURE — 36415 COLL VENOUS BLD VENIPUNCTURE: CPT | Performed by: OBSTETRICS & GYNECOLOGY

## 2020-03-16 PROCEDURE — 81002 URINALYSIS NONAUTO W/O SCOPE: CPT | Performed by: OBSTETRICS & GYNECOLOGY

## 2020-03-16 NOTE — PROGRESS NOTES
NERI  Doing well  RH +  S/P Anatomy scan nl  1 hr glucose (24-28wks) today  TDAP recommended during pregnancy and instructions given  RTC q4wks

## 2020-04-20 ENCOUNTER — TELEPHONE (OUTPATIENT)
Dept: OBGYN CLINIC | Facility: CLINIC | Age: 30
End: 2020-04-20

## 2020-04-20 NOTE — TELEPHONE ENCOUNTER
NERI otoole cancelled for 4/17 due to office closure. Patient didn't call back to reschedule. Called again. left message for her to call our office.

## 2020-04-23 ENCOUNTER — ROUTINE PRENATAL (OUTPATIENT)
Dept: OBGYN CLINIC | Facility: CLINIC | Age: 30
End: 2020-04-23
Payer: COMMERCIAL

## 2020-04-23 VITALS — WEIGHT: 185.19 LBS | DIASTOLIC BLOOD PRESSURE: 70 MMHG | BODY MASS INDEX: 30 KG/M2 | SYSTOLIC BLOOD PRESSURE: 110 MMHG

## 2020-04-23 DIAGNOSIS — Z34.82 ENCOUNTER FOR SUPERVISION OF OTHER NORMAL PREGNANCY IN SECOND TRIMESTER: ICD-10-CM

## 2020-04-23 DIAGNOSIS — Z36.9 PRENATAL SCREENING ENCOUNTER: Primary | ICD-10-CM

## 2020-04-23 PROCEDURE — 81002 URINALYSIS NONAUTO W/O SCOPE: CPT | Performed by: OBSTETRICS & GYNECOLOGY

## 2020-04-23 PROCEDURE — 87389 HIV-1 AG W/HIV-1&-2 AB AG IA: CPT | Performed by: OBSTETRICS & GYNECOLOGY

## 2020-05-06 PROBLEM — Z34.93 ENCOUNTER FOR SUPERVISION OF NORMAL PREGNANCY IN THIRD TRIMESTER (HCC): Status: ACTIVE | Noted: 2020-01-06

## 2020-05-06 PROBLEM — Z34.93 ENCOUNTER FOR SUPERVISION OF NORMAL PREGNANCY IN THIRD TRIMESTER: Status: ACTIVE | Noted: 2020-01-06

## 2020-05-07 ENCOUNTER — ROUTINE PRENATAL (OUTPATIENT)
Dept: OBGYN CLINIC | Facility: CLINIC | Age: 30
End: 2020-05-07
Payer: COMMERCIAL

## 2020-05-07 VITALS
WEIGHT: 187 LBS | OXYGEN SATURATION: 99 % | BODY MASS INDEX: 30 KG/M2 | SYSTOLIC BLOOD PRESSURE: 120 MMHG | HEART RATE: 96 BPM | DIASTOLIC BLOOD PRESSURE: 70 MMHG | TEMPERATURE: 98 F

## 2020-05-07 DIAGNOSIS — Z34.93 ENCOUNTER FOR SUPERVISION OF NORMAL PREGNANCY IN THIRD TRIMESTER, UNSPECIFIED GRAVIDITY: Primary | ICD-10-CM

## 2020-05-07 DIAGNOSIS — Z36.9 PRENATAL SCREENING ENCOUNTER: ICD-10-CM

## 2020-05-07 PROCEDURE — 81002 URINALYSIS NONAUTO W/O SCOPE: CPT | Performed by: OBSTETRICS & GYNECOLOGY

## 2020-05-07 NOTE — PATIENT INSTRUCTIONS
FETAL MOVEMENT CHART    Begin counting fetal movements at 32 weeks of pregnancy. You may find that your baby is more active after eating or drinking. We want you to time how long it takes to feel 10 movements (kicks, flutters, swishes or rolls).   Meera Freedman you experience any symptoms prior to 12 weeks, please call the office at [phone]. Colds/Congestion: Saline Nasal Spray, Neti Pot, Plain Robitussin or DM, Mucinex DM, Vicks 44 E, Vicks Vapor rub, Cough drops without Zinc or Sudafed.    Contact your

## 2020-05-07 NOTE — PROGRESS NOTES
NERI  Doing well, +FM  Denies LOF/VB/uctx  Rh positive, TDAP declined, EPDS 0  Fetal movement count given  Reports sciatic nerve pain, provided back in pregnancy pamphlet, advised stretches and cold compress. Declines pelvic floor PT at this time.      RTC i

## 2020-05-23 ENCOUNTER — ROUTINE PRENATAL (OUTPATIENT)
Dept: OBGYN CLINIC | Facility: CLINIC | Age: 30
End: 2020-05-23
Payer: COMMERCIAL

## 2020-05-23 VITALS — WEIGHT: 188 LBS | SYSTOLIC BLOOD PRESSURE: 126 MMHG | BODY MASS INDEX: 30 KG/M2 | DIASTOLIC BLOOD PRESSURE: 80 MMHG

## 2020-05-23 DIAGNOSIS — Z34.93 ENCOUNTER FOR SUPERVISION OF NORMAL PREGNANCY IN THIRD TRIMESTER, UNSPECIFIED GRAVIDITY: ICD-10-CM

## 2020-05-23 DIAGNOSIS — Z36.9 PRENATAL SCREENING ENCOUNTER: Primary | ICD-10-CM

## 2020-05-23 PROCEDURE — 81002 URINALYSIS NONAUTO W/O SCOPE: CPT | Performed by: OBSTETRICS & GYNECOLOGY

## 2020-06-01 ENCOUNTER — ROUTINE PRENATAL (OUTPATIENT)
Dept: OBGYN CLINIC | Facility: CLINIC | Age: 30
End: 2020-06-01
Payer: COMMERCIAL

## 2020-06-01 VITALS
TEMPERATURE: 98 F | BODY MASS INDEX: 30 KG/M2 | DIASTOLIC BLOOD PRESSURE: 64 MMHG | SYSTOLIC BLOOD PRESSURE: 118 MMHG | WEIGHT: 187 LBS

## 2020-06-01 DIAGNOSIS — Z34.82 ENCOUNTER FOR SUPERVISION OF OTHER NORMAL PREGNANCY IN SECOND TRIMESTER: ICD-10-CM

## 2020-06-01 DIAGNOSIS — Z36.9 PRENATAL SCREENING ENCOUNTER: Primary | ICD-10-CM

## 2020-06-01 PROCEDURE — 81002 URINALYSIS NONAUTO W/O SCOPE: CPT | Performed by: OBSTETRICS & GYNECOLOGY

## 2020-06-04 ENCOUNTER — TELEPHONE (OUTPATIENT)
Dept: OBGYN CLINIC | Facility: CLINIC | Age: 30
End: 2020-06-04

## 2020-06-04 NOTE — TELEPHONE ENCOUNTER
Pt last seen 6/1/20 for manasa; next appt 6/8/20. Pt reports diarrhea yesterday from 11-7; stopped last night and has not reoccurred today. Pt denies any nausea, vomiting or fever.   Pt reports mild abd cramping today; denies bleeding or LOF; baby moving wel

## 2020-06-04 NOTE — TELEPHONE ENCOUNTER
Pt called again to address diarrhea issues    Future Appointments   Date Time Provider Anatoly Castañeda   6/8/2020  2:30 PM Lisa Conti MD [facility] [address]

## 2020-06-04 NOTE — TELEPHONE ENCOUNTER
Patient has diarrhea. She would also like to come in on Saturday, maybe an acute spot. Please call patient.

## 2020-06-05 NOTE — TELEPHONE ENCOUNTER
Contacted patient. She reports that her diarrhea has gone away. Denies any SOB, cough, fever, n/v. Advised to keep appt on Monday.

## 2020-06-08 ENCOUNTER — ROUTINE PRENATAL (OUTPATIENT)
Dept: OBGYN CLINIC | Facility: CLINIC | Age: 30
End: 2020-06-08
Payer: COMMERCIAL

## 2020-06-08 VITALS
WEIGHT: 187 LBS | DIASTOLIC BLOOD PRESSURE: 66 MMHG | BODY MASS INDEX: 30 KG/M2 | TEMPERATURE: 98 F | SYSTOLIC BLOOD PRESSURE: 118 MMHG

## 2020-06-08 DIAGNOSIS — Z36.9 PRENATAL SCREENING ENCOUNTER: Primary | ICD-10-CM

## 2020-06-08 DIAGNOSIS — Z3A.36 36 WEEKS GESTATION OF PREGNANCY: ICD-10-CM

## 2020-06-08 PROCEDURE — 81002 URINALYSIS NONAUTO W/O SCOPE: CPT | Performed by: OBSTETRICS & GYNECOLOGY

## 2020-06-08 PROCEDURE — 87653 STREP B DNA AMP PROBE: CPT | Performed by: OBSTETRICS & GYNECOLOGY

## 2020-06-08 PROCEDURE — 87081 CULTURE SCREEN ONLY: CPT | Performed by: OBSTETRICS & GYNECOLOGY

## 2020-06-08 NOTE — PATIENT INSTRUCTIONS
Labor Instructions    How do I know if it’s true labor? • One of the most important aspects of any pregnancy is being able to recognize the onset of labor.   Unfortunately, on occasion it can be difficult or confusing, especially if you have had one or mor of the contractions. Having regular (usually closer), longer lasting (35-70 seconds), and sharper (more painful) contractions are the common symptoms of actual labor.   The second way in which labor can begin which occurs in approximately 30% of all patien care.  The various options may also have been discussed in Prenatal Classes. We utilize IV narcotics and epidural anesthesia when our patients request to have them. If you chose to have no anesthesia, none will be administered.   A local anesthetic may be you should continue your prenatal vitamins. If you do not breastfeed, simply finish the prenatal vitamins you have. The staff at [facility] wish you a joyous and exciting birth.   If there is anything we can do to make this a better e

## 2020-06-08 NOTE — PROGRESS NOTES
NERI  Doing well, +FM  Denies VB/LOF/uctx  Mode of delivery:   anticipated  SVE cl/75/-2   GBS collected (35-37)  RTC 1 week

## 2020-06-15 ENCOUNTER — ROUTINE PRENATAL (OUTPATIENT)
Dept: OBGYN CLINIC | Facility: CLINIC | Age: 30
End: 2020-06-15
Payer: COMMERCIAL

## 2020-06-15 VITALS
SYSTOLIC BLOOD PRESSURE: 108 MMHG | DIASTOLIC BLOOD PRESSURE: 60 MMHG | TEMPERATURE: 98 F | BODY MASS INDEX: 30 KG/M2 | WEIGHT: 187.63 LBS

## 2020-06-15 DIAGNOSIS — Z34.83 ENCOUNTER FOR SUPERVISION OF OTHER NORMAL PREGNANCY IN THIRD TRIMESTER: Primary | ICD-10-CM

## 2020-06-15 PROCEDURE — 81002 URINALYSIS NONAUTO W/O SCOPE: CPT | Performed by: OBSTETRICS & GYNECOLOGY

## 2020-06-15 NOTE — PROGRESS NOTES
NERI  [id number]  GA: 37w3d   06/15/20  1336   BP: 108/60   Temp: 98.1 °F (36.7 °C)   TempSrc: Oral   Weight: 187 lb 9.6 oz (85.1 kg)       Doing well, +FM  Denies LOF/VB/uctx  Mode of delivery:  anticipated  SVE deferred    GBS negative   Fetal movement co

## 2020-06-15 NOTE — PATIENT INSTRUCTIONS
Labor Instructions    How do I know if it’s true labor? • One of the most important aspects of any pregnancy is being able to recognize the onset of labor.   Unfortunately, on occasion it can be difficult or confusing, especially if you have had one or mor of the contractions. Having regular (usually closer), longer lasting (35-70 seconds), and sharper (more painful) contractions are the common symptoms of actual labor.   The second way in which labor can begin which occurs in approximately 30% of all patien the room during your labor. During the delivery, the nurses will inform you of the hospital policy and how many coaches are allowed. You may desire pain medication or anesthesia for pain.   You probably discussed some aspects of pain medication with us dur Please call the office within a few days after you are discharged from the hospital to schedule your post-partum visit, which is usually 4-6 weeks after delivery. Any medications necessary will be discussed on an individual basis.   If you decide to james Time M T W Th F S S                                                                                                           Time M T W Th

## 2020-06-22 ENCOUNTER — ROUTINE PRENATAL (OUTPATIENT)
Dept: OBGYN CLINIC | Facility: CLINIC | Age: 30
End: 2020-06-22
Payer: COMMERCIAL

## 2020-06-22 VITALS
DIASTOLIC BLOOD PRESSURE: 68 MMHG | BODY MASS INDEX: 31 KG/M2 | SYSTOLIC BLOOD PRESSURE: 102 MMHG | TEMPERATURE: 98 F | WEIGHT: 190 LBS

## 2020-06-22 DIAGNOSIS — Z36.9 PRENATAL SCREENING ENCOUNTER: ICD-10-CM

## 2020-06-22 DIAGNOSIS — Z3A.38 38 WEEKS GESTATION OF PREGNANCY: Primary | ICD-10-CM

## 2020-06-22 PROCEDURE — 81002 URINALYSIS NONAUTO W/O SCOPE: CPT | Performed by: OBSTETRICS & GYNECOLOGY

## 2020-06-22 NOTE — PROGRESS NOTES
NERI  Doing well, +FM  Denies VB/LOF/uctx  Mode of delivery:   anticipated  SVE 1//-4, vtx   GBS collected (35-37)NEG  RTC 1 week

## 2020-06-29 ENCOUNTER — ROUTINE PRENATAL (OUTPATIENT)
Dept: OBGYN CLINIC | Facility: CLINIC | Age: 30
End: 2020-06-29
Payer: COMMERCIAL

## 2020-06-29 VITALS — DIASTOLIC BLOOD PRESSURE: 78 MMHG | SYSTOLIC BLOOD PRESSURE: 118 MMHG | BODY MASS INDEX: 31 KG/M2 | WEIGHT: 190.63 LBS

## 2020-06-29 DIAGNOSIS — Z34.83 ENCOUNTER FOR SUPERVISION OF OTHER NORMAL PREGNANCY IN THIRD TRIMESTER: ICD-10-CM

## 2020-06-29 DIAGNOSIS — Z36.9 PRENATAL SCREENING ENCOUNTER: Primary | ICD-10-CM

## 2020-06-29 PROCEDURE — 81002 URINALYSIS NONAUTO W/O SCOPE: CPT | Performed by: OBSTETRICS & GYNECOLOGY

## 2020-06-29 NOTE — PROGRESS NOTES
NERI 39w3d    Doing well, wants to be induced, +FM  Intermittent contractions  No LOF, VB  SVE: 1/L/high, membranes swept    1. FHT-present  2. PNL:  GBS neg  3. Mode of delivery: desires EIOL, called L and D and tomorrow is available.  She will d/c with her

## 2020-06-30 ENCOUNTER — ANESTHESIA EVENT (OUTPATIENT)
Dept: OBGYN UNIT | Facility: HOSPITAL | Age: 30
End: 2020-06-30
Payer: COMMERCIAL

## 2020-06-30 ENCOUNTER — HOSPITAL ENCOUNTER (INPATIENT)
Facility: HOSPITAL | Age: 30
LOS: 1 days | Discharge: HOME | End: 2020-07-01
Attending: OBSTETRICS & GYNECOLOGY | Admitting: OBSTETRICS & GYNECOLOGY
Payer: COMMERCIAL

## 2020-06-30 ENCOUNTER — ANESTHESIA (OUTPATIENT)
Dept: OBGYN UNIT | Facility: HOSPITAL | Age: 30
End: 2020-06-30
Payer: COMMERCIAL

## 2020-06-30 ENCOUNTER — APPOINTMENT (OUTPATIENT)
Dept: OBGYN CLINIC | Facility: HOSPITAL | Age: 30
End: 2020-06-30
Payer: COMMERCIAL

## 2020-06-30 PROBLEM — Z34.93 ENCOUNTER FOR SUPERVISION OF NORMAL PREGNANCY IN THIRD TRIMESTER: Status: RESOLVED | Noted: 2020-01-06 | Resolved: 2020-06-30

## 2020-06-30 PROBLEM — Z34.90 PREGNANCY (HCC): Status: ACTIVE | Noted: 2020-06-30

## 2020-06-30 PROBLEM — Z34.90 PREGNANCY: Status: ACTIVE | Noted: 2020-06-30

## 2020-06-30 PROBLEM — Z34.90 PREGNANCY (HCC): Status: RESOLVED | Noted: 2020-06-30 | Resolved: 2020-06-30

## 2020-06-30 PROBLEM — Z34.90 PREGNANCY: Status: RESOLVED | Noted: 2020-06-30 | Resolved: 2020-06-30

## 2020-06-30 PROBLEM — Z34.93 ENCOUNTER FOR SUPERVISION OF NORMAL PREGNANCY IN THIRD TRIMESTER (HCC): Status: RESOLVED | Noted: 2020-01-06 | Resolved: 2020-06-30

## 2020-06-30 LAB
ANTIBODY SCREEN: NEGATIVE
BASOPHILS # BLD AUTO: 0 X10(3) UL (ref 0–0.2)
BASOPHILS NFR BLD AUTO: 0 %
DEPRECATED RDW RBC AUTO: 47.1 FL (ref 35.1–46.3)
EOSINOPHIL # BLD AUTO: 0.02 X10(3) UL (ref 0–0.7)
EOSINOPHIL NFR BLD AUTO: 0.5 %
ERYTHROCYTE [DISTWIDTH] IN BLOOD BY AUTOMATED COUNT: 13.8 % (ref 11–15)
HCT VFR BLD AUTO: 35.7 % (ref 35–48)
HGB BLD-MCNC: 11.8 G/DL (ref 12–16)
IMM GRANULOCYTES # BLD AUTO: 0.01 X10(3) UL (ref 0–1)
IMM GRANULOCYTES NFR BLD: 0.2 %
LYMPHOCYTES # BLD AUTO: 0.87 X10(3) UL (ref 1–4)
LYMPHOCYTES NFR BLD AUTO: 19.6 %
MCH RBC QN AUTO: 30.7 PG (ref 26–34)
MCHC RBC AUTO-ENTMCNC: 33.1 G/DL (ref 31–37)
MCV RBC AUTO: 93 FL (ref 80–100)
MONOCYTES # BLD AUTO: 0.35 X10(3) UL (ref 0.1–1)
MONOCYTES NFR BLD AUTO: 7.9 %
NEUTROPHILS # BLD AUTO: 3.18 X10 (3) UL (ref 1.5–7.7)
NEUTROPHILS # BLD AUTO: 3.18 X10(3) UL (ref 1.5–7.7)
NEUTROPHILS NFR BLD AUTO: 71.8 %
PLATELET # BLD AUTO: 139 10(3)UL (ref 150–450)
RBC # BLD AUTO: 3.84 X10(6)UL (ref 3.8–5.3)
RH BLOOD TYPE: POSITIVE
SARS-COV-2 RNA RESP QL NAA+PROBE: NOT DETECTED
T PALLIDUM AB SER QL IA: NONREACTIVE
WBC # BLD AUTO: 4.4 X10(3) UL (ref 4–11)

## 2020-06-30 PROCEDURE — 3E033VJ INTRODUCTION OF OTHER HORMONE INTO PERIPHERAL VEIN, PERCUTANEOUS APPROACH: ICD-10-PCS | Performed by: OBSTETRICS & GYNECOLOGY

## 2020-06-30 PROCEDURE — 59400 OBSTETRICAL CARE: CPT | Performed by: OBSTETRICS & GYNECOLOGY

## 2020-06-30 RX ORDER — TERBUTALINE SULFATE 1 MG/ML
0.25 INJECTION, SOLUTION SUBCUTANEOUS AS NEEDED
Status: DISCONTINUED | OUTPATIENT
Start: 2020-06-30 | End: 2020-06-30

## 2020-06-30 RX ORDER — AMMONIA INHALANTS 0.04 G/.3ML
0.3 INHALANT RESPIRATORY (INHALATION) AS NEEDED
Status: DISCONTINUED | OUTPATIENT
Start: 2020-06-30 | End: 2020-06-30

## 2020-06-30 RX ORDER — DEXTROSE, SODIUM CHLORIDE, SODIUM LACTATE, POTASSIUM CHLORIDE, AND CALCIUM CHLORIDE 5; .6; .31; .03; .02 G/100ML; G/100ML; G/100ML; G/100ML; G/100ML
INJECTION, SOLUTION INTRAVENOUS AS NEEDED
Status: DISCONTINUED | OUTPATIENT
Start: 2020-06-30 | End: 2020-06-30

## 2020-06-30 RX ORDER — SODIUM CHLORIDE, SODIUM LACTATE, POTASSIUM CHLORIDE, CALCIUM CHLORIDE 600; 310; 30; 20 MG/100ML; MG/100ML; MG/100ML; MG/100ML
INJECTION, SOLUTION INTRAVENOUS CONTINUOUS
Status: DISCONTINUED | OUTPATIENT
Start: 2020-06-30 | End: 2020-06-30

## 2020-06-30 RX ORDER — IBUPROFEN 600 MG/1
600 TABLET ORAL EVERY 6 HOURS PRN
Status: DISCONTINUED | OUTPATIENT
Start: 2020-06-30 | End: 2020-06-30

## 2020-06-30 RX ORDER — IBUPROFEN 600 MG/1
600 TABLET ORAL EVERY 6 HOURS SCHEDULED
Status: DISCONTINUED | OUTPATIENT
Start: 2020-06-30 | End: 2020-07-01

## 2020-06-30 RX ORDER — MISOPROSTOL 200 UG/1
TABLET ORAL
Status: COMPLETED
Start: 2020-06-30 | End: 2020-06-30

## 2020-06-30 RX ORDER — TRISODIUM CITRATE DIHYDRATE AND CITRIC ACID MONOHYDRATE 500; 334 MG/5ML; MG/5ML
30 SOLUTION ORAL AS NEEDED
Status: DISCONTINUED | OUTPATIENT
Start: 2020-06-30 | End: 2020-06-30

## 2020-06-30 RX ORDER — MISOPROSTOL 200 UG/1
1000 TABLET ORAL ONCE
Status: COMPLETED | OUTPATIENT
Start: 2020-06-30 | End: 2020-06-30

## 2020-06-30 RX ORDER — DIPHENHYDRAMINE HYDROCHLORIDE 50 MG/ML
12.5 INJECTION INTRAMUSCULAR; INTRAVENOUS EVERY 4 HOURS PRN
Status: DISCONTINUED | OUTPATIENT
Start: 2020-06-30 | End: 2020-06-30

## 2020-06-30 RX ORDER — DOCUSATE SODIUM 100 MG/1
100 CAPSULE, LIQUID FILLED ORAL 2 TIMES DAILY
Status: DISCONTINUED | OUTPATIENT
Start: 2020-06-30 | End: 2020-07-01

## 2020-06-30 RX ORDER — MISOPROSTOL 200 UG/1
1000 TABLET ORAL ONCE AS NEEDED
Status: DISCONTINUED | OUTPATIENT
Start: 2020-06-30 | End: 2020-06-30

## 2020-06-30 RX ORDER — BISACODYL 10 MG
10 SUPPOSITORY, RECTAL RECTAL ONCE AS NEEDED
Status: DISCONTINUED | OUTPATIENT
Start: 2020-06-30 | End: 2020-07-01

## 2020-06-30 RX ORDER — EPHEDRINE SULFATE/0.9% NACL/PF 25 MG/5 ML
5 SYRINGE (ML) INTRAVENOUS AS NEEDED
Status: DISCONTINUED | OUTPATIENT
Start: 2020-06-30 | End: 2020-06-30

## 2020-06-30 RX ORDER — SIMETHICONE 80 MG
80 TABLET,CHEWABLE ORAL 3 TIMES DAILY PRN
Status: DISCONTINUED | OUTPATIENT
Start: 2020-06-30 | End: 2020-07-01

## 2020-06-30 RX ORDER — ACETAMINOPHEN 325 MG/1
650 TABLET ORAL EVERY 6 HOURS PRN
Status: DISCONTINUED | OUTPATIENT
Start: 2020-06-30 | End: 2020-07-01

## 2020-06-30 RX ORDER — ACETAMINOPHEN 500 MG
500 TABLET ORAL EVERY 6 HOURS PRN
Status: DISCONTINUED | OUTPATIENT
Start: 2020-06-30 | End: 2020-06-30

## 2020-06-30 RX ORDER — ONDANSETRON 2 MG/ML
4 INJECTION INTRAMUSCULAR; INTRAVENOUS EVERY 6 HOURS PRN
Status: DISCONTINUED | OUTPATIENT
Start: 2020-06-30 | End: 2020-06-30

## 2020-06-30 NOTE — PLAN OF CARE
Problem: BIRTH - VAGINAL/ SECTION  Goal: Fetal and maternal status remain reassuring during the birth process  Description  INTERVENTIONS:  - Monitor vital signs  - Monitor fetal heart rate  - Monitor uterine activity  - Monitor labor progression assessment scores as ordered  Patient scores pain a \"3\" or less  Multidisciplinary care   Nonpharmacologic comfort measures      Interventions:   -   - See additional Care Plan goals for specific interventions   Outcome: Completed

## 2020-06-30 NOTE — ANESTHESIA PREPROCEDURE EVALUATION
PRE-OP EVALUATION    Patient Name: Corrine Dennis    Pre-op Diagnosis: * No surgery found *    * No surgery found *    * Surgery not found *    Pre-op vitals reviewed.   Temp: 97.2 °F (36.2 °C)  Pulse: 73  Resp: 16  BP: 122/77  SpO2: 100 %  Body mass Endo/Other    Negative endo/other ROS. Pulmonary    Negative pulmonary ROS. Neuro/Psych    Negative neuro/psych ROS. History reviewed.  No pertinent surgical hist

## 2020-06-30 NOTE — L&D DELIVERY NOTE
SonnyRufus [[id number]]    Labor Events     labor?:  No   steroids?:  None  Antibiotics received during labor?:  No  Rupture date/time:  2020 1536     Rupture type:  AROM  Fluid color:  Clear  Induction:  Oxytocin  Indications for inducti Minute:   10 Minute:   15 Minute:   20 Minute:     Skin color: 0  1       Heart rate: 2  2       Reflex irritablity: 2  2       Muscle tone: 2  2       Respiratory effort: 2  2       Total: 8  9          Apgars assigned by:  Riddhi Llanos RN   disposition: 3 vessels. Umbilical cord gases were not sent. The perineum was inspected and no lacerations were noted. A small perineal abrasion was noted but hemostatic and no repair was needed.  Her IV was not functioning at the time, and intermittent gushes of ac

## 2020-06-30 NOTE — PROGRESS NOTES
OB PN    S: feeling UC's slightly stronger but not enough to want epidural    O:    06/30/20  1400   BP: 116/78   Pulse: 69   Resp: 16   Temp:      FHT: baseline 140, mod tila, + accels, no decels  [place]: q 2-3 min    SVE: 3/50/-3, AROM with FSE clear fluid

## 2020-06-30 NOTE — PROGRESS NOTES
Pt is a 30year old female admitted to   Patient presents with:  Scheduled Induction    Pt is a 39w4d intrauterine pregnancy    History obtained, consents, signed. Pt oriented to room, staff, and plan of care.

## 2020-06-30 NOTE — PROGRESS NOTES
OB PN    S: Pt just got epidural. Still feeling some UCs    O:    06/30/20  1630   BP: 122/77   Pulse: 73   Resp:    Temp:      FHT: baseline 140, mod tila, +accels, occasional mild variable decels noted  [place]: q 2-3 min, pit @ 22    SVE: per RN 3-4/70/-2

## 2020-06-30 NOTE — PROGRESS NOTES
OB PN    S: Feeling some UC's but not very painful.     O:    06/30/20  1100   BP: 126/79   Pulse: 68   Resp: 16   Temp:      FHT: baseline 150, mod tila, + accels, no decels  [place]: q 3-5 min    SVE: 2/50/very high, anterior, head asynclitic     A/P: 31 yo G

## 2020-06-30 NOTE — ANESTHESIA PROCEDURE NOTES
Labor Analgesia  Performed by: Estefania Pruitt MD  Authorized by: Estefania Pruitt MD       General Information and Staff    Start Time:  6/30/2020 4:18 PM  End Time:  6/30/2020 4:33 PM  Anesthesiologist:  Estefania Pruitt MD  Performed by:   Anesthesiologist  Luli

## 2020-07-01 VITALS
BODY MASS INDEX: 30.53 KG/M2 | OXYGEN SATURATION: 100 % | DIASTOLIC BLOOD PRESSURE: 79 MMHG | HEIGHT: 66 IN | TEMPERATURE: 98 F | HEART RATE: 69 BPM | WEIGHT: 190 LBS | SYSTOLIC BLOOD PRESSURE: 116 MMHG | RESPIRATION RATE: 16 BRPM

## 2020-07-01 LAB
BASOPHILS # BLD AUTO: 0.02 X10(3) UL (ref 0–0.2)
BASOPHILS NFR BLD AUTO: 0.3 %
DEPRECATED RDW RBC AUTO: 47.4 FL (ref 35.1–46.3)
EOSINOPHIL # BLD AUTO: 0.02 X10(3) UL (ref 0–0.7)
EOSINOPHIL NFR BLD AUTO: 0.3 %
ERYTHROCYTE [DISTWIDTH] IN BLOOD BY AUTOMATED COUNT: 13.5 % (ref 11–15)
HCT VFR BLD AUTO: 33.9 % (ref 35–48)
HGB BLD-MCNC: 11.1 G/DL (ref 12–16)
IMM GRANULOCYTES # BLD AUTO: 0.01 X10(3) UL (ref 0–1)
IMM GRANULOCYTES NFR BLD: 0.2 %
LYMPHOCYTES # BLD AUTO: 0.99 X10(3) UL (ref 1–4)
LYMPHOCYTES NFR BLD AUTO: 15.3 %
MCH RBC QN AUTO: 31.2 PG (ref 26–34)
MCHC RBC AUTO-ENTMCNC: 32.7 G/DL (ref 31–37)
MCV RBC AUTO: 95.2 FL (ref 80–100)
MONOCYTES # BLD AUTO: 0.46 X10(3) UL (ref 0.1–1)
MONOCYTES NFR BLD AUTO: 7.1 %
NEUTROPHILS # BLD AUTO: 4.96 X10 (3) UL (ref 1.5–7.7)
NEUTROPHILS # BLD AUTO: 4.96 X10(3) UL (ref 1.5–7.7)
NEUTROPHILS NFR BLD AUTO: 76.8 %
PLATELET # BLD AUTO: 129 10(3)UL (ref 150–450)
RBC # BLD AUTO: 3.56 X10(6)UL (ref 3.8–5.3)
WBC # BLD AUTO: 6.5 X10(3) UL (ref 4–11)

## 2020-07-01 NOTE — PROGRESS NOTES
[facility]  Post-Partum Progress Note    Vidya Dennis Patient Status:  Inpatient    1990 MRN [id number]   [facility] ([place], [place]) 2SW-J Attending Archana Andrea MD   Hosp Day # 1 PCP None Pcp     SUBJECTIVE:    Postpartum Day 1:

## 2020-07-01 NOTE — PROGRESS NOTES
Transfer to mother/baby unit in stable condition. Room environment and  Plan of care discussed with pt verbalizing understanding. Instructed to call for assistance when getting up to use restroom for the first time. Call light within reach.

## 2020-07-01 NOTE — PROGRESS NOTES
Labor Analgesia Follow Up Note    Patient underwent epidural anesthesia for labor analgesia,    Placenta Date/Time: 6/30/2020  5:50 PM    Delivery Date/Time[de-identified] 6/30/2020  5:45 PM    /79 (BP Location: Right arm)   Pulse 69   Temp 98 °F (36.7 °C) (Oral)

## 2020-07-01 NOTE — DISCHARGE SUMMARY
[facility]  Discharge Summary    Sary Morris Sonny Patient Status:  inpatient    1990 MRN [id number]   Location -A Attending [facility] Day # 1 PCP None Pcp     Date of Admission: 2020    Date of Discharge: 20    Ad

## 2020-07-02 NOTE — PROGRESS NOTES
Discharge instructions given and signed.  Pt to be discharged in stable condition to home with infant via wheelchair and belongings on cart

## 2020-07-07 ENCOUNTER — TELEPHONE (OUTPATIENT)
Dept: OBGYN UNIT | Facility: HOSPITAL | Age: 30
End: 2020-07-07

## 2020-07-13 ENCOUNTER — TELEPHONE (OUTPATIENT)
Dept: OBGYN CLINIC | Facility: CLINIC | Age: 30
End: 2020-07-13

## 2020-08-17 ENCOUNTER — TELEPHONE (OUTPATIENT)
Dept: OBGYN CLINIC | Facility: CLINIC | Age: 30
End: 2020-08-17

## 2020-08-17 NOTE — TELEPHONE ENCOUNTER
Patient had  on 2020. Patient states she stopped bleeding for 1-2 days but then started again. She denies any abdominal pain or cramping. Bleeding is not heavy, alternates between light and moderate bleeding. She is breast feeding.  Denies any oth

## 2020-08-17 NOTE — TELEPHONE ENCOUNTER
Pt calling in stating she wants to come in sooner. She stated she is bleeding and would like to talk to a nurse.  Please advise

## 2020-08-17 NOTE — TELEPHONE ENCOUNTER
Per Dr. Jiuce Harrison bleeding can occur for 6 weeks. Patient is 5 days past 6 weeks. Advised patient if bleeding worsens or becomes heavy to let us know. Otherwise, to keep appt as scheduled for 08/25.

## 2020-08-25 ENCOUNTER — POSTPARTUM (OUTPATIENT)
Dept: OBGYN CLINIC | Facility: CLINIC | Age: 30
End: 2020-08-25
Payer: COMMERCIAL

## 2020-08-25 VITALS
TEMPERATURE: 98 F | SYSTOLIC BLOOD PRESSURE: 100 MMHG | DIASTOLIC BLOOD PRESSURE: 60 MMHG | HEIGHT: 66 IN | BODY MASS INDEX: 26.52 KG/M2 | WEIGHT: 165 LBS

## 2020-08-25 PROCEDURE — 3074F SYST BP LT 130 MM HG: CPT | Performed by: OBSTETRICS & GYNECOLOGY

## 2020-08-25 PROCEDURE — 3078F DIAST BP <80 MM HG: CPT | Performed by: OBSTETRICS & GYNECOLOGY

## 2020-08-25 PROCEDURE — 3008F BODY MASS INDEX DOCD: CPT | Performed by: OBSTETRICS & GYNECOLOGY

## 2020-08-25 NOTE — PROGRESS NOTES
POSTPARTUM VISIT    S: patient is a 30year old  who presents today for post partum visit. She underwent  on 20 to baby boy. She is doing well. Bleeding took longer to stop this time and had more pain but both resolved.  Staying at home fo tightening and toning    RTO for annual exam in 6 months    Rubén Aguayo, 08/25/20, 1:39 PM

## 2020-08-28 NOTE — TELEPHONE ENCOUNTER
Patient was seen on 08/25 for PP appointment. She had been having irregular bleeding since delivery but stated it has stopped 1-2 days prior to appt. She had intercourse on 08/26 and now has started bleeding again. Denies any pain.  Denies any other concern

## 2020-08-29 ENCOUNTER — HOSPITAL ENCOUNTER (OUTPATIENT)
Age: 30
Discharge: HOME | End: 2020-08-29
Attending: EMERGENCY MEDICINE
Payer: COMMERCIAL

## 2020-08-29 VITALS
SYSTOLIC BLOOD PRESSURE: 122 MMHG | OXYGEN SATURATION: 99 % | HEIGHT: 66 IN | RESPIRATION RATE: 18 BRPM | BODY MASS INDEX: 26.52 KG/M2 | TEMPERATURE: 98 F | DIASTOLIC BLOOD PRESSURE: 81 MMHG | HEART RATE: 66 BPM | WEIGHT: 165 LBS

## 2020-08-29 DIAGNOSIS — N93.9 VAGINAL BLEEDING: Primary | ICD-10-CM

## 2020-08-29 LAB
#MXD IC: 0.3 X10ˆ3/UL (ref 0.1–1)
HCT VFR BLD AUTO: 38.7 % (ref 35–48)
HGB BLD-MCNC: 12.2 G/DL (ref 12–16)
LYMPHOCYTES # BLD AUTO: 1.4 X10ˆ3/UL (ref 1–4)
LYMPHOCYTES NFR BLD AUTO: 38.1 %
MCH RBC QN AUTO: 30 PG (ref 26–34)
MCHC RBC AUTO-ENTMCNC: 31.5 G/DL (ref 31–37)
MCV RBC AUTO: 95.1 FL (ref 80–100)
MIXED CELL %: 9.5 %
NEUTROPHILS # BLD AUTO: 1.9 X10ˆ3/UL (ref 1.5–7.7)
NEUTROPHILS NFR BLD AUTO: 52.4 %
PLATELET # BLD AUTO: 141 X10ˆ3/UL (ref 150–450)
POCT BILIRUBIN URINE: NEGATIVE
POCT GLUCOSE URINE: NEGATIVE MG/DL
POCT KETONE URINE: NEGATIVE MG/DL
POCT LEUKOCYTE ESTERASE URINE: NEGATIVE
POCT NITRITE URINE: NEGATIVE
POCT PH URINE: 6 (ref 5–8)
POCT SPECIFIC GRAVITY URINE: 1.01
POCT URINE PREGNANCY: NEGATIVE
POCT UROBILINOGEN URINE: 0.2 MG/DL
RBC # BLD AUTO: 4.07 X10ˆ6/UL (ref 3.8–5.3)
WBC # BLD AUTO: 3.6 X10ˆ3/UL (ref 4–11)

## 2020-08-29 PROCEDURE — 85025 COMPLETE CBC W/AUTO DIFF WBC: CPT | Performed by: EMERGENCY MEDICINE

## 2020-08-29 PROCEDURE — 81002 URINALYSIS NONAUTO W/O SCOPE: CPT | Performed by: EMERGENCY MEDICINE

## 2020-08-29 PROCEDURE — 36415 COLL VENOUS BLD VENIPUNCTURE: CPT

## 2020-08-29 PROCEDURE — 99214 OFFICE O/P EST MOD 30 MIN: CPT

## 2020-08-29 PROCEDURE — 81025 URINE PREGNANCY TEST: CPT | Performed by: EMERGENCY MEDICINE

## 2020-08-29 NOTE — ED INITIAL ASSESSMENT (HPI)
Vaginal bleeding - pt had   . Pt ahs light bleeding since delivery stopped 2 weeks. Pt saw GARRY Tuesday was told everything was ok that she may have sex.  Pt states she had sex that night then started to have bleeding again heavy bleeding  with

## 2020-08-29 NOTE — ED PROVIDER NOTES
Patient Seen in: [facility]      History   Patient presents with:  Vaginal Bleeding    Stated Complaint: vaginal bleeding    HPI    53-year-old presents to the emergency department with vaginal bleeding.   She is 2 months postpar and nursing note reviewed. Constitutional:       General: She is not in acute distress. Appearance: She is well-developed. She is not diaphoretic. HENT:      Head: Atraumatic.       Right Ear: External ear normal.      Left Ear: External ear normal. Color Red (*)     Urine Clarity Turbid (*)     Blood, Urine Large (*)     Protein urine Trace (*)     All other components within normal limits   POCT CBC - Abnormal; Notable for the following components:    WBC IC 3.6 (*)     PLT .0 (*)     All othe

## 2021-04-20 ENCOUNTER — IMMUNIZATION (OUTPATIENT)
Dept: LAB | Age: 31
End: 2021-04-20
Attending: HOSPITALIST
Payer: COMMERCIAL

## 2021-04-20 DIAGNOSIS — Z23 NEED FOR VACCINATION: Primary | ICD-10-CM

## 2021-04-20 PROCEDURE — 0001A SARSCOV2 VAC 30MCG/0.3ML IM: CPT

## 2021-05-15 ENCOUNTER — IMMUNIZATION (OUTPATIENT)
Dept: LAB | Age: 31
End: 2021-05-15
Attending: HOSPITALIST
Payer: COMMERCIAL

## 2021-05-15 DIAGNOSIS — Z23 NEED FOR VACCINATION: Primary | ICD-10-CM

## 2021-05-15 PROCEDURE — 0002A SARSCOV2 VAC 30MCG/0.3ML IM: CPT

## 2021-06-03 ENCOUNTER — HOSPITAL ENCOUNTER (OUTPATIENT)
Dept: GENERAL RADIOLOGY | Age: 31
Discharge: HOME | End: 2021-06-03
Attending: PHYSICIAN ASSISTANT
Payer: COMMERCIAL

## 2021-06-03 ENCOUNTER — OFFICE VISIT (OUTPATIENT)
Dept: FAMILY MEDICINE CLINIC | Facility: CLINIC | Age: 31
End: 2021-06-03
Payer: COMMERCIAL

## 2021-06-03 VITALS
RESPIRATION RATE: 20 BRPM | HEIGHT: 66 IN | OXYGEN SATURATION: 94 % | DIASTOLIC BLOOD PRESSURE: 76 MMHG | TEMPERATURE: 98 F | SYSTOLIC BLOOD PRESSURE: 124 MMHG | BODY MASS INDEX: 25.23 KG/M2 | WEIGHT: 157 LBS | HEART RATE: 78 BPM

## 2021-06-03 DIAGNOSIS — Z11.1 SCREENING-PULMONARY TB: ICD-10-CM

## 2021-06-03 DIAGNOSIS — R76.12 POSITIVE QUANTIFERON-TB GOLD TEST: ICD-10-CM

## 2021-06-03 DIAGNOSIS — R76.12 POSITIVE QUANTIFERON-TB GOLD TEST: Primary | ICD-10-CM

## 2021-06-03 PROCEDURE — 99203 OFFICE O/P NEW LOW 30 MIN: CPT | Performed by: PHYSICIAN ASSISTANT

## 2021-06-03 PROCEDURE — 3008F BODY MASS INDEX DOCD: CPT | Performed by: PHYSICIAN ASSISTANT

## 2021-06-03 PROCEDURE — 71046 X-RAY EXAM CHEST 2 VIEWS: CPT | Performed by: PHYSICIAN ASSISTANT

## 2021-06-03 PROCEDURE — 3078F DIAST BP <80 MM HG: CPT | Performed by: PHYSICIAN ASSISTANT

## 2021-06-03 PROCEDURE — 3074F SYST BP LT 130 MM HG: CPT | Performed by: PHYSICIAN ASSISTANT

## 2021-06-03 NOTE — PROGRESS NOTES
HPI/Subjective:   Patient ID: Jayda Reyes is a 26year old female. HPI   Patient who is new to our practice presents requesting a chest x-ray. She will be starting a new job soon and was required to undergo TB screening.   She had a quant to [employer] rate and regular rhythm. Heart sounds: Normal heart sounds. Pulmonary:      Effort: Pulmonary effort is normal.      Breath sounds: Normal breath sounds. No wheezing or rales.    Musculoskeletal:      Cervical back: Normal range of motion and neck reyes

## 2021-06-10 ENCOUNTER — TELEPHONE (OUTPATIENT)
Dept: FAMILY MEDICINE CLINIC | Facility: CLINIC | Age: 31
End: 2021-06-10

## 2021-06-10 ENCOUNTER — LAB ENCOUNTER (OUTPATIENT)
Dept: LAB | Age: 31
End: 2021-06-10
Attending: PHYSICIAN ASSISTANT
Payer: COMMERCIAL

## 2021-06-10 DIAGNOSIS — Z01.84 IMMUNITY STATUS TESTING: ICD-10-CM

## 2021-06-10 DIAGNOSIS — Z78.9 MEASLES, MUMPS, RUBELLA (MMR) VACCINATION STATUS UNKNOWN: ICD-10-CM

## 2021-06-10 DIAGNOSIS — Z11.59 SCREENING FOR MEASLES: Primary | ICD-10-CM

## 2021-06-10 DIAGNOSIS — Z78.9 VARICELLA VACCINATION STATUS UNKNOWN: ICD-10-CM

## 2021-06-10 PROCEDURE — 86735 MUMPS ANTIBODY: CPT | Performed by: PHYSICIAN ASSISTANT

## 2021-06-10 PROCEDURE — 86762 RUBELLA ANTIBODY: CPT | Performed by: PHYSICIAN ASSISTANT

## 2021-06-10 PROCEDURE — 86787 VARICELLA-ZOSTER ANTIBODY: CPT | Performed by: PHYSICIAN ASSISTANT

## 2021-06-10 PROCEDURE — 86706 HEP B SURFACE ANTIBODY: CPT | Performed by: PHYSICIAN ASSISTANT

## 2021-06-10 PROCEDURE — 86765 RUBEOLA ANTIBODY: CPT | Performed by: PHYSICIAN ASSISTANT

## 2021-06-10 NOTE — TELEPHONE ENCOUNTER
Patient came to  requesting labs to check for immunity of Varicella - MMR    Can we place orders for her.

## 2021-07-08 NOTE — TELEPHONE ENCOUNTER
Meds: PNV  PMH: None  PSH: None  Denies any complications in prior pregnancies. Denies any questions or concerns at this time. Advised to keep appt as scheduled. Date of Encounter: 2021  Patient: Debbie Bauman,  2000    Subjective   History of Presenting Illness  Chief complaint: Fever, rash    2-week history of rash with subjective fever TM 99.2, nausea, swollen posterior cervical lymph nodes, myalgias.  Symptoms have mostly resolved with exception of the rash and swollen lymph nodes.  Rashes present on palms of hands, soles of feet, and sores on the tongue. Child is sick with similar illness but no rash.  Partner is starting to cough today.  She is not taking any over-the-counter medication for this.  Denies groin lesion, weight loss.    The following portions of the patient's history were reviewed and updated as appropriate: allergies, current medications, past family history, past medical history, past social history, past surgical history and problem list.    Patient Active Problem List   Diagnosis   • Family history of diabetes mellitus   • Menorrhagia with irregular cycle   • Major depressive disorder   • False positive serology for HIV   • Cyst of left ovary   • Polysubstance abuse (CMS/HCC)   • Seasonal allergic rhinitis due to pollen   • Dyspepsia     Past Medical History:   Diagnosis Date   • ADHD (attention deficit hyperactivity disorder)    • Chlamydia        • Chronic hypertension in pregnancy 2018   • Depression    • Dizziness 2018   • Essential hypertension 2018   • Heart palpitations 2018   • Hypertension    • Inattention 2017   • Increased thirst 2018   • Major depression, recurrent (CMS/HCC) 2020   • Menstrual cycle problem 2018   • Pain in radius 2017   • Varicella      Past Surgical History:   Procedure Laterality Date   • TONSILLECTOMY     • WISDOM TOOTH EXTRACTION       Family History   Problem Relation Age of Onset   • Heart attack Father    • Diabetes Father    • Hypertension Father    • Ovarian cancer Mother    • Stroke Paternal Grandfather        Current Outpatient Medications:   •   Levonorgestrel (ANITHA) 13.5 MG intrauterine device IUD, 1 each by Intrauterine route 1 (One) Time., Disp: , Rfl:   •  benzonatate (Tessalon Perles) 100 MG capsule, Take 1 capsule by mouth 3 (Three) Times a Day As Needed for Cough., Disp: 30 capsule, Rfl: 1  •  fluticasone (FLONASE) 50 MCG/ACT nasal spray, Use two sprays in each nostril daily for congestion, Disp: 9.9 mL, Rfl: 3  •  ondansetron (Zofran) 4 MG tablet, Take 1 tablet by mouth Every 8 (Eight) Hours As Needed for Nausea or Vomiting., Disp: 30 tablet, Rfl: 0  •  pantoprazole (PROTONIX) 40 MG EC tablet, Take 1 tablet by mouth Daily As Needed (heartburn)., Disp: 60 tablet, Rfl: 0  •  sertraline (Zoloft) 50 MG tablet, Take 1 tab PO QD for mood, Disp: 60 tablet, Rfl: 1  Allergies   Allergen Reactions   • Bee Venom Swelling     Social History     Tobacco Use   • Smoking status: Former Smoker     Packs/day: 0.10     Types: Cigarettes     Quit date: 3/28/2018     Years since quitting: 3.2   • Smokeless tobacco: Never Used   Substance Use Topics   • Alcohol use: Yes     Comment: occasionally   • Drug use: Yes     Types: Marijuana     Comment: occasionally          Objective   Physical Exam  Vitals:    07/08/21 1619   BP: 118/74   Pulse: 94   Temp: 97.5 °F (36.4 °C)   TempSrc: Temporal   SpO2: 100%     There is no height or weight on file to calculate BMI.    Constitutional: NAD.  Eyes: EOMI. PERRLA. Normal conjunctiva.  Cardiovascular: RRR. No murmurs. No LE edema b/l. Radial pulses 2+ bilaterally.  Pulmonary: CTA b/l. Good effort.  Integumentary: Erythematous macules, 1 to 2 mm each, on palms of both hands, soles of feet.  1 to 2 mm ulcers on anterior tongue.  Lymphatic: Multiple tender posterior cervical lymph nodes.  None noted anteriorly.  Endocrine: No thyromegaly or palpable thyroid nodules.  Psychiatric: Normal affect. Normal thought content.     Assessment/Plan   Assessment and Plan  Pleasant 20-year-old female with major depressive disorder, polysubstance  use disorder, menorrhagia with irregular cycle, dyspepsia, who presents with the following:    Diagnoses and all orders for this visit:    1. Hand, foot and mouth disease (Primary)    Examination and history most consistent with hand-foot-and-mouth disease.  Posterior cervical lymphadenopathy is marked but also concordant temporally with this illness.  She also reports what sounds like epitrochlear lymphadenopathy but has also resolved. Discussed etiology, prognosis and management of hand-foot-and-mouth disease, although it seems that she is on the back end of this illness.  If her cervical lymphadenopathy was not to improve over the next few weeks she needs additional evaluation ASAP.    Guillermo Ambriz MD  Family Medicine  O: [phone]  C: [phone]    Disclaimer: Parts of this note were dictated by speech recognition. Minor errors in transcription may be present. Please call if questions.

## 2022-06-09 ENCOUNTER — OFFICE VISIT (OUTPATIENT)
Dept: OBGYN CLINIC | Facility: CLINIC | Age: 32
End: 2022-06-09
Payer: COMMERCIAL

## 2022-06-09 VITALS
SYSTOLIC BLOOD PRESSURE: 120 MMHG | HEART RATE: 71 BPM | BODY MASS INDEX: 26.54 KG/M2 | DIASTOLIC BLOOD PRESSURE: 78 MMHG | HEIGHT: 66 IN | WEIGHT: 165.13 LBS

## 2022-06-09 DIAGNOSIS — Z12.4 CERVICAL CANCER SCREENING: ICD-10-CM

## 2022-06-09 DIAGNOSIS — Z01.419 WELL WOMAN EXAM WITH ROUTINE GYNECOLOGICAL EXAM: Primary | ICD-10-CM

## 2022-06-09 PROCEDURE — 3078F DIAST BP <80 MM HG: CPT | Performed by: NURSE PRACTITIONER

## 2022-06-09 PROCEDURE — 99395 PREV VISIT EST AGE 18-39: CPT | Performed by: NURSE PRACTITIONER

## 2022-06-09 PROCEDURE — 3008F BODY MASS INDEX DOCD: CPT | Performed by: NURSE PRACTITIONER

## 2022-06-09 PROCEDURE — 3074F SYST BP LT 130 MM HG: CPT | Performed by: NURSE PRACTITIONER

## 2022-06-09 RX ORDER — MULTIVITAMIN
TABLET ORAL
COMMUNITY

## 2022-06-14 LAB — HPV I/H RISK 1 DNA SPEC QL NAA+PROBE: NEGATIVE

## 2023-11-24 ENCOUNTER — OFFICE VISIT (OUTPATIENT)
Dept: FAMILY MEDICINE CLINIC | Facility: CLINIC | Age: 33
End: 2023-11-24
Payer: COMMERCIAL

## 2023-11-24 VITALS
DIASTOLIC BLOOD PRESSURE: 68 MMHG | WEIGHT: 172 LBS | RESPIRATION RATE: 17 BRPM | SYSTOLIC BLOOD PRESSURE: 110 MMHG | OXYGEN SATURATION: 99 % | HEIGHT: 66 IN | BODY MASS INDEX: 27.64 KG/M2 | TEMPERATURE: 72 F

## 2023-11-24 DIAGNOSIS — N92.0 MENORRHAGIA WITH REGULAR CYCLE: Primary | ICD-10-CM

## 2023-11-24 PROCEDURE — 3008F BODY MASS INDEX DOCD: CPT | Performed by: PHYSICIAN ASSISTANT

## 2023-11-24 PROCEDURE — 99214 OFFICE O/P EST MOD 30 MIN: CPT | Performed by: PHYSICIAN ASSISTANT

## 2023-11-24 PROCEDURE — 3074F SYST BP LT 130 MM HG: CPT | Performed by: PHYSICIAN ASSISTANT

## 2023-11-24 PROCEDURE — 3078F DIAST BP <80 MM HG: CPT | Performed by: PHYSICIAN ASSISTANT

## 2023-11-24 RX ORDER — TRANEXAMIC ACID 650 MG/1
1300 TABLET ORAL 3 TIMES DAILY
Qty: 30 TABLET | Refills: 5 | Status: SHIPPED | OUTPATIENT
Start: 2023-11-24

## 2023-11-24 NOTE — PROGRESS NOTES
Subjective:   Patient ID: Edison Auguste is a 26year old female. Menstrual Problem  Pertinent negatives include no chills, fever or headaches. Patient presents to discuss menorrhagia. Menarche age 13  Regular cycles since then  One time it stopped for 6 months and then normalized afterward    Since 2021 periods have been much heavier  Period is getting longer too, lasts 7 days or so  Heavier on days 2, 3, and 4  Changing her pad/tampon every 2 hours  Passing larger clots those days  Worsening cramps also  Sexually active and has children  Not on any form of birth control  Pap smear is up to date    Tried nexplanon in the past but caused her to bleed for a few weeks  The OCP she thinks caused her some chest pain  Did not tolerate birth control patch    She is currently taking iron supplements for h/o anemia    History/Other:   Review of Systems   Constitutional:  Negative for chills, diaphoresis and fever. Eyes:  Negative for visual disturbance. Respiratory:  Negative for chest tightness and shortness of breath. Cardiovascular:  Negative for chest pain, palpitations and leg swelling. Genitourinary:  Positive for menstrual problem. Neurological:  Negative for dizziness, light-headedness and headaches. Current Outpatient Medications   Medication Sig Dispense Refill    Multiple Vitamin (MULTI-DAY VITAMINS) Oral Tab Take by mouth. Allergies:No Known Allergies    Objective:   Physical Exam  Vitals and nursing note reviewed. Constitutional:       Appearance: She is well-developed. HENT:      Head: Normocephalic and atraumatic. Eyes:      Conjunctiva/sclera: Conjunctivae normal.      Pupils: Pupils are equal, round, and reactive to light. Cardiovascular:      Rate and Rhythm: Normal rate and regular rhythm. Heart sounds: Normal heart sounds. Pulmonary:      Effort: Pulmonary effort is normal.      Breath sounds: Normal breath sounds. No wheezing or rales. Musculoskeletal:      Cervical back: Normal range of motion and neck supple. Neurological:      Mental Status: She is alert. Assessment & Plan:   1. Menorrhagia with regular cycle  Check ultrasound to evaluate for possible fibroids, thickened endometrium, polyps. Check labs to evaluate for anemia, iron deficiency. Follow up pending results to discuss next steps. In the meantime okay to use lysteda as directed. - US PELVIS W EV (OBI=[id number]); Future  - CBC With Differential With Platelet; Future  - Iron And Tibc [E]; Future  - Ferritin [E];  Future

## 2024-05-28 ENCOUNTER — OFFICE VISIT (OUTPATIENT)
Facility: CLINIC | Age: 34
End: 2024-05-28

## 2024-05-28 ENCOUNTER — LAB ENCOUNTER (OUTPATIENT)
Dept: LAB | Age: 34
End: 2024-05-28
Attending: OBSTETRICS & GYNECOLOGY
Payer: COMMERCIAL

## 2024-05-28 VITALS
SYSTOLIC BLOOD PRESSURE: 102 MMHG | HEIGHT: 66 IN | DIASTOLIC BLOOD PRESSURE: 60 MMHG | BODY MASS INDEX: 26.68 KG/M2 | WEIGHT: 166 LBS

## 2024-05-28 DIAGNOSIS — Z13.220 SCREENING FOR HYPERLIPIDEMIA: ICD-10-CM

## 2024-05-28 DIAGNOSIS — R35.1 NOCTURIA: ICD-10-CM

## 2024-05-28 DIAGNOSIS — N92.0 MENORRHAGIA WITH REGULAR CYCLE: ICD-10-CM

## 2024-05-28 DIAGNOSIS — Z13.220 SCREENING FOR HYPERLIPIDEMIA: Primary | ICD-10-CM

## 2024-05-28 DIAGNOSIS — Z01.419 WELL WOMAN EXAM WITH ROUTINE GYNECOLOGICAL EXAM: ICD-10-CM

## 2024-05-28 LAB
APPEARANCE: CLEAR
BASOPHILS # BLD AUTO: 0.02 X10(3) UL (ref 0–0.2)
BASOPHILS NFR BLD AUTO: 0.6 %
BILIRUBIN: NEGATIVE
CHOLEST SERPL-MCNC: 165 MG/DL (ref ?–200)
DEPRECATED HBV CORE AB SER IA-ACNC: 12.7 NG/ML
EOSINOPHIL # BLD AUTO: 0.03 X10(3) UL (ref 0–0.7)
EOSINOPHIL NFR BLD AUTO: 0.9 %
ERYTHROCYTE [DISTWIDTH] IN BLOOD BY AUTOMATED COUNT: 12.8 %
FASTING PATIENT LIPID ANSWER: NO
GLUCOSE (URINE DIPSTICK): NEGATIVE MG/DL
HCT VFR BLD AUTO: 38.2 %
HDLC SERPL-MCNC: 73 MG/DL (ref 40–59)
HGB BLD-MCNC: 12.1 G/DL
IMM GRANULOCYTES # BLD AUTO: 0.01 X10(3) UL (ref 0–1)
IMM GRANULOCYTES NFR BLD: 0.3 %
KETONES (URINE DIPSTICK): NEGATIVE MG/DL
LDLC SERPL CALC-MCNC: 84 MG/DL (ref ?–100)
LYMPHOCYTES # BLD AUTO: 1.34 X10(3) UL (ref 1–4)
LYMPHOCYTES NFR BLD AUTO: 39.3 %
MCH RBC QN AUTO: 31.4 PG (ref 26–34)
MCHC RBC AUTO-ENTMCNC: 31.7 G/DL (ref 31–37)
MCV RBC AUTO: 99.2 FL
MONOCYTES # BLD AUTO: 0.17 X10(3) UL (ref 0.1–1)
MONOCYTES NFR BLD AUTO: 5 %
NEUTROPHILS # BLD AUTO: 1.84 X10 (3) UL (ref 1.5–7.7)
NEUTROPHILS # BLD AUTO: 1.84 X10(3) UL (ref 1.5–7.7)
NEUTROPHILS NFR BLD AUTO: 53.9 %
NITRITE, URINE: NEGATIVE
NONHDLC SERPL-MCNC: 92 MG/DL (ref ?–130)
PH, URINE: 7 (ref 4.5–8)
PLATELET # BLD AUTO: 169 10(3)UL (ref 150–450)
PROTEIN (URINE DIPSTICK): NEGATIVE MG/DL
RBC # BLD AUTO: 3.85 X10(6)UL
SPECIFIC GRAVITY: 1.01 (ref 1–1.03)
TRIGL SERPL-MCNC: 34 MG/DL (ref 30–149)
TSI SER-ACNC: 1.04 MIU/ML (ref 0.36–3.74)
URINE-COLOR: YELLOW
UROBILINOGEN,SEMI-QN: 0.2 MG/DL (ref 0–1.9)
VLDLC SERPL CALC-MCNC: 5 MG/DL (ref 0–30)
WBC # BLD AUTO: 3.4 X10(3) UL (ref 4–11)

## 2024-05-28 PROCEDURE — 3078F DIAST BP <80 MM HG: CPT | Performed by: OBSTETRICS & GYNECOLOGY

## 2024-05-28 PROCEDURE — 99385 PREV VISIT NEW AGE 18-39: CPT | Performed by: OBSTETRICS & GYNECOLOGY

## 2024-05-28 PROCEDURE — 81003 URINALYSIS AUTO W/O SCOPE: CPT | Performed by: OBSTETRICS & GYNECOLOGY

## 2024-05-28 PROCEDURE — 87086 URINE CULTURE/COLONY COUNT: CPT | Performed by: OBSTETRICS & GYNECOLOGY

## 2024-05-28 PROCEDURE — 82728 ASSAY OF FERRITIN: CPT

## 2024-05-28 PROCEDURE — 85025 COMPLETE CBC W/AUTO DIFF WBC: CPT

## 2024-05-28 PROCEDURE — 80061 LIPID PANEL: CPT

## 2024-05-28 PROCEDURE — 3008F BODY MASS INDEX DOCD: CPT | Performed by: OBSTETRICS & GYNECOLOGY

## 2024-05-28 PROCEDURE — 84443 ASSAY THYROID STIM HORMONE: CPT

## 2024-05-28 PROCEDURE — 3074F SYST BP LT 130 MM HG: CPT | Performed by: OBSTETRICS & GYNECOLOGY

## 2024-05-28 PROCEDURE — 36415 COLL VENOUS BLD VENIPUNCTURE: CPT

## 2024-05-28 RX ORDER — FERROUS SULFATE 325(65) MG
325 TABLET ORAL DAILY
COMMUNITY

## 2024-05-28 NOTE — PROGRESS NOTES
GYN H&P     Genetic questionnaire reviewed with the patient and she will be referred for genetic counseling if the questionnaire had any positive results.    The [facility] intake form was also reviewed regarding contraception, menstrual periods, urinary health, and vaginal / sexual health    2024  8:12 AM    Chief Complaint   Patient presents with    Annual     Pt c/o heavy bleeding during menses - sometimes will bleed after intercourse   Pt c/o frequent urination at night starting last year.        HPI: Neelam is a 33 year old  Patient's last menstrual period was 2024 (exact date).  (contraception: Vasectomy) here for her annual gyn exam.     She has  complaints heavy periods and nocturia - once woke up 7-8 times/ night.  Only has one glass of water late at night.  No dysuria. Notes some tenesmus and sense of fullness.      Heavy periods - need to do a better HPI next visit.    Transferred to us b/c she wanted to see a doctor    Previous encounters and chart reviewed.     OB History    Para Term  AB Living   4 3 3 0 1 3   SAB IAB Ectopic Multiple Live Births   1 0 0 0 3      # Outcome Date GA Lbr Tacho/2nd Weight Sex Type Anes PTL Lv   4 Term 20 39w4d 02:02 / 00:07 7 lb 13.2 oz (3.55 kg) M NORMAL SPONT EPI N SHAHLA   3 SAB 2019     SAB      2 Term 17 40w4d 06:38 / 00:06 9 lb 1 oz (4.11 kg) M NORMAL SPONT EPI N SHAHLA      Birth Comments: Born to 25 yo   No complications.  Passed hearing  Supplemented w/ some formula due to concerns of not getting enough bm.      Complications: Variable decelerations   1 Term 14 39w6d 08:41 / 00:16 7 lb 13.2 oz (3.55 kg) M NORMAL SPONT Local N SHAHLA       GYN hx:   Menarche: 12  Period Cycle (Days): 28-30 days  Period Duration (Days): 5-7 days  Period Flow: heavy day 2-3 then lightens up  Use of Birth Control (if yes, specify type): Vasectomy  Hx Prior Abnormal Pap: No  Pap Date: 22  Pap Result Notes: wnl  Follow  Up Recommendation: repeat 2025      Past Medical History:    Anemia    during pregnancy    Dysmenorrhea     History reviewed. No pertinent surgical history.  No Known Allergies  Current Outpatient Medications on File Prior to Visit   Medication Sig Dispense Refill    Ferrous Sulfate 325 (65 Fe) MG Oral Tab Take 1 tablet (325 mg total) by mouth daily.      Multiple Vitamin (MULTI-DAY VITAMINS) Oral Tab Take by mouth.      tranexamic acid (LYSTEDA) 650 MG Oral Tab Take 2 tablets (1,300 mg total) by mouth in the morning, at noon, and at bedtime. (Patient not taking: Reported on 5/28/2024) 30 tablet 5     No current facility-administered medications on file prior to visit.     Family History   Problem Relation Age of Onset    Diabetes Paternal Grandmother     Hypertension Father     Arthritis Mother     Arthritis Maternal Grandmother     Cancer Neg     Heart Disease Neg     Stroke Neg      Social History     Socioeconomic History    Marital status:      Spouse name: Not on file    Number of children: Not on file    Years of education: Not on file    Highest education level: Not on file   Occupational History    Not on file   Tobacco Use    Smoking status: Never    Smokeless tobacco: Never   Vaping Use    Vaping status: Never Used   Substance and Sexual Activity    Alcohol use: Not Currently    Drug use: No    Sexual activity: Yes     Partners: Male   Other Topics Concern     Service Not Asked    Blood Transfusions Not Asked    Caffeine Concern Yes    Occupational Exposure Not Asked    Hobby Hazards Not Asked    Sleep Concern Not Asked    Stress Concern Not Asked    Weight Concern Not Asked    Special Diet Not Asked    Back Care Not Asked    Exercise Yes     Comment: 2-3 x a week 30 min    Bike Helmet Not Asked    Seat Belt Yes    Self-Exams Not Asked   Social History Narrative    Not on file     Social Determinants of Health     Financial Resource Strain: Not on file   Food Insecurity: Not on file    Transportation Needs: Not on file   Physical Activity: Not on file   Stress: Not on file   Social Connections: Not on file   Housing Stability: Not on file       ROS:     Review of Systems:  General: denies fevers, chills, fatigue and malaise.   Eyes: no visual changes, denies headaches  ENT: no complaints, denies earaches, runny nose, epistaxis, throat pain or sore throat  Respiratory: denies SOB, dyspnea, cough or wheezing  Cardiovascular: denies chest pain, palpitations, exercise intolerance   GI: denies abdominal pain, diarrhea, constipation  : no complaints, denies dysuria, increased urinary frequency.  no dyspareunia   Hematological/lymphatic: denies history of excessive bleeding or bruising, denies dizziness, lightheadedness.   Breast: denies rashes, skin changes, pain, lumps or discharge   Psychiatric: denies depression, changes in sleep patterns, anxiety  Endocrine: denies hot or cold intolerance, mood changes   Neurological: denies changes in sight, smell, hearing or taste. Denies seizures or tremors  Immunological: denies allergies, denies anaphylaxis, or swollen lymph nodes  Musculoskeletal: denies joint pain, morning stiffness, decreased range of motion         O /60   Ht 66\"   Wt 166 lb (75.3 kg)   LMP 05/18/2024 (Exact Date)   BMI 26.79 kg/m²         Wt Readings from Last 6 Encounters:   05/28/24 166 lb (75.3 kg)   11/24/23 172 lb (78 kg)   06/09/22 165 lb 2 oz (74.9 kg)   06/03/21 157 lb (71.2 kg)   08/29/20 165 lb (74.8 kg)   08/25/20 165 lb (74.8 kg)     Exam:   GENERAL: well developed, well nourished, in no apparent distress, oriented.  SKIN: no rashes, no suspicious lesions  HEENT: normal  NECK: supple; no thyromegaly, no adenopathy  LUNGS: clear to auscultation  CARDIOVASCULAR: normal S1, S2, RRR  BREASTS: soft, nontender, no palpable masses or nodes, no nipple discharge, no skin changes, no axillary adenopathy  ABDOMEN: no scars,  soft, non distended; non tender, no  masses  PELVIC: External Genitalia: Normal appearing, no lesions.    Vagina: normal pink mucosa, no lesions, normal clear discharge.    Bladder well supported.  No  anterior or posterior hernias    Cervix: multiparous, no lesions , No CMT     Uterus: bulky AVAF - 8 week size - consistent with fibroids, mobile, non tender,     Adnexa: non tender, no masses, normal size    Rectal: deferred  EXTREMITIES:  non tender without edema        A/P: Patient is 33 year old female with no complaints. Here for well woman exam.         Patient counseled on:    Diet/exercise.      Self Breast Exams     Safe sex practices / and living environment     Vaccines:  Annual Flu, Tdap +/- Gardasil not recommended (up to 45 yrs).      Pneumococcal at 65 yrs old, Shingles at 60 yrs old          Pap: neg/neg - Year:  2022  GC/Chlamydia:  na  Mammogram:  na   Dexa:  na  Colonoscopy / Cologuard:   na  Lipid / Cholesterol:       Collected 6/30/2020  8:32 AM       Status: Final result    0 Result Notes      Component  Ref Range & Units 6/30/20  8:32 AM   WBC  4.0 - 11.0 x10(3) uL 4.4   RBC  3.80 - 5.30 x10(6)uL 3.84   HGB  12.0 - 16.0 g/dL 11.8 Low    HCT  35.0 - 48.0 % 35.7   PLT  150.0 - 450.0 10(3)uL 139.0 Low         Status: Final result       Visible to patient: Yes (not seen)       Dx: Nocturia    0 Result Notes              Component  Ref Range & Units  8:45 AM  (5/28/24) 3 yr ago  (6/29/20) 3 yr ago  (6/22/20) 3 yr ago  (6/15/20) 3 yr ago  (6/8/20) 3 yr ago  (6/1/20) 4 yr ago  (5/23/20)   Glucose Urine  Negative mg/dL Negative neg R Negative R Neg R Negative R Negative R neg R   Bilirubin Urine  Negative Negative         Ketones, UA  Negative - Trace mg/dL Negative         Spec Gravity  1.005 - 1.030 1.010         Blood Urine  Negative Trace-intact Abnormal          PH Urine  5.0 - 8.0 7.0         Protein Urine  Negative - Trace mg/dL Negative neg R NEgative R Neg R Negative R Negative R neg R   Urobilinogen Urine  0.2 - 1.0 mg/dL 0.2          Nitrite Urine  Negative Negative         Leukocyte Esterase Urine  Negative Small Abnormal          APPEARANCE  Clear clear         Color Urine  Yellow yellow              Meds This Visit:    Requested Prescriptions      No prescriptions requested or ordered in this encounter       1. Screening for hyperlipidemia  - CBC With Differential With Platelet; Future  - Fasting Glucose; Future  - LIPID PANEL W LDL/HDL RATIO; Future    2. Well woman exam with routine gynecological exam    3. Menorrhagia with regular cycle  - TSH W Reflex To Free T4; Future  - FERRITIN [457] [Q]; Future    4. Nocturia  - Urinalysis [07760]  - Culture Urine; Future      Return in about 1 week (around 6/4/2024) for ultrasound, Embx.    Quinn Davis MD   5/28/2024  8:12 AM

## 2024-06-12 ENCOUNTER — OFFICE VISIT (OUTPATIENT)
Facility: CLINIC | Age: 34
End: 2024-06-12
Payer: COMMERCIAL

## 2024-06-12 VITALS
SYSTOLIC BLOOD PRESSURE: 104 MMHG | DIASTOLIC BLOOD PRESSURE: 70 MMHG | WEIGHT: 168 LBS | HEIGHT: 66 IN | BODY MASS INDEX: 27 KG/M2

## 2024-06-12 DIAGNOSIS — N92.1 METRORRHAGIA: ICD-10-CM

## 2024-06-12 DIAGNOSIS — Z32.00 PREGNANCY EXAMINATION OR TEST, PREGNANCY UNCONFIRMED: ICD-10-CM

## 2024-06-12 DIAGNOSIS — N92.0 MENORRHAGIA WITH REGULAR CYCLE: Primary | ICD-10-CM

## 2024-06-12 LAB
CONTROL LINE PRESENT WITH A CLEAR BACKGROUND (YES/NO): YES YES/NO
KIT LOT #: NORMAL NUMERIC
PREGNANCY TEST, URINE: NEGATIVE

## 2024-06-12 PROCEDURE — 3078F DIAST BP <80 MM HG: CPT | Performed by: OBSTETRICS & GYNECOLOGY

## 2024-06-12 PROCEDURE — 3008F BODY MASS INDEX DOCD: CPT | Performed by: OBSTETRICS & GYNECOLOGY

## 2024-06-12 PROCEDURE — 58100 BIOPSY OF UTERUS LINING: CPT | Performed by: OBSTETRICS & GYNECOLOGY

## 2024-06-12 PROCEDURE — 81025 URINE PREGNANCY TEST: CPT | Performed by: OBSTETRICS & GYNECOLOGY

## 2024-06-12 PROCEDURE — 3074F SYST BP LT 130 MM HG: CPT | Performed by: OBSTETRICS & GYNECOLOGY

## 2024-06-12 PROCEDURE — 88305 TISSUE EXAM BY PATHOLOGIST: CPT | Performed by: OBSTETRICS & GYNECOLOGY

## 2024-06-12 NOTE — PATIENT INSTRUCTIONS
Endometrial Biopsy Instructions    Ultrasound report was reviewed with the patient.  Pelvic rest for 3 days

## 2024-06-12 NOTE — PROGRESS NOTES
Endometrial Biopsy     Pre-Procedure Care:   Consent was obtained.  Procedure/risks were explained.  Questions were answered.  Correct patient was identified.  Correct side and site were confirmed.    Pregnancy Results: negative from urine test   Birth control method(s) used: vasectomy    Indication:     Pre-Medications:    The patient was premedicated with Ibuprofen .    Description of Procedure:   A speculum was placed in the vagina and the cervix was prepped with betadine solution.   Single tooth tenaculum was not needed  The uterine cavity was sounded at 9 cm.   The endometrial cavity was curetted for pipelle tissue sampling with >5 passes.  Specimen was sent to pathology.   The single tooth tenaculum was removed.   Silver nitrate was applied at the site of tenaculum application   Good hemostasis was noted.  There were no complications.    There was no blood loss.      Discharge instructions were provided to the patient.    Visit Plan:  Counseled on treatment options  Ultrasound report was reviewed with the patient.  Pelvic ultrasound done for menorrhagia June 12, 2024 transvaginal ultrasound was used.    Findings anteverted anteflexed uterus measuring 11.4 x 8.3 x 7.3 cm with 31 mm homogeneous endometrial stripe.  Right ovary measures 5.0 x 5.2 x 2.8 cm the left ovary measures 3.3 x 3.5 x 2.0 cm.  There are multiple follicles around the periphery of the ovary suggestive of chronic anovulation.  There is a trace amount of fluid in the cul-de-sac.    Impression: Uterus with a very thickened endometrial stripe and ovaries that are consistent with chronic anovulation.    Treatment options discussed.   Will probably need D&C due to thick lining - ablation, H-IUD, OCP's, Lysteda info  Fu 2 weeks.   Quinn Davis MD  6/12/2024  3:45 PM

## 2024-07-16 ENCOUNTER — MED REC SCAN ONLY (OUTPATIENT)
Facility: CLINIC | Age: 34
End: 2024-07-16

## 2025-06-19 ENCOUNTER — TELEPHONE (OUTPATIENT)
Facility: CLINIC | Age: 35
End: 2025-06-19

## 2025-07-03 ENCOUNTER — OFFICE VISIT (OUTPATIENT)
Facility: CLINIC | Age: 35
End: 2025-07-03
Payer: COMMERCIAL

## 2025-07-03 VITALS
BODY MASS INDEX: 26.68 KG/M2 | DIASTOLIC BLOOD PRESSURE: 80 MMHG | HEIGHT: 66 IN | SYSTOLIC BLOOD PRESSURE: 120 MMHG | WEIGHT: 166 LBS

## 2025-07-03 DIAGNOSIS — R35.1 NOCTURIA: ICD-10-CM

## 2025-07-03 DIAGNOSIS — Z01.419 WELL WOMAN EXAM WITH ROUTINE GYNECOLOGICAL EXAM: Primary | ICD-10-CM

## 2025-07-03 DIAGNOSIS — N92.0 MENORRHAGIA WITH REGULAR CYCLE: ICD-10-CM

## 2025-07-03 DIAGNOSIS — Z12.4 CERVICAL CANCER SCREENING: ICD-10-CM

## 2025-07-03 DIAGNOSIS — Z91.89 RELIES ON PARTNER VASECTOMY FOR CONTRACEPTION: ICD-10-CM

## 2025-07-03 LAB
BILIRUBIN: NEGATIVE
GLUCOSE (URINE DIPSTICK): NEGATIVE MG/DL
LEUKOCYTES: NEGATIVE
NITRITE, URINE: NEGATIVE
PH, URINE: 7.5 (ref 4.5–8)
PROTEIN (URINE DIPSTICK): 30 MG/DL
SPECIFIC GRAVITY: 1.01 (ref 1–1.03)
UROBILINOGEN,SEMI-QN: 0.2 MG/DL (ref 0–1.9)

## 2025-07-03 PROCEDURE — 87624 HPV HI-RISK TYP POOLED RSLT: CPT

## 2025-07-03 PROCEDURE — 88175 CYTOPATH C/V AUTO FLUID REDO: CPT

## 2025-07-03 PROCEDURE — 99395 PREV VISIT EST AGE 18-39: CPT

## 2025-07-03 PROCEDURE — 87086 URINE CULTURE/COLONY COUNT: CPT

## 2025-07-03 PROCEDURE — 81003 URINALYSIS AUTO W/O SCOPE: CPT

## 2025-07-03 NOTE — PROGRESS NOTES
GYN H&P     Genetic questionnaire reviewed with the patient and she will be referred for genetic counseling if the questionnaire had any positive results.    The [facility] intake form was also reviewed regarding contraception, menstrual periods, urinary health, and vaginal / sexual health    The patient was offered a medical chaperone during the visit    7/3/2025  11:36 AM    Chief Complaint   Patient presents with    Physical     No concerns.       HPI: Neelam is a 34 year old  Patient's last menstrual period was 2025 (exact date).  (contraception: vasectomy) here for her annual gyn exam.     Continues to have heavy menstrual periods - was seen for the same 2024 and an endometrial biopsy was done and negative. The patient decided to forgo treatment at that time but the heavy periods persist.  Menses last 5-7 days in length and will often saturate through a pad within 1-2 hours. Menses are regular, denies intermenstrual bleeding.     Also reports hx of nocturia for the past few years - wakes 5-8 times per night to void. Denies issues during the day, no leakage of urine. Does admit that if she holds her urine for too long, when she does void she experiences a pressure in her low abdomen as she is voiding. Reports she drinks a lot during the day and prior to going to bed.    Denies any pelvic or breast complaints.  Satisfied with current contraception.     Previous encounters and chart reviewed.     OB History    Para Term  AB Living   4 3 3 0 1 3   SAB IAB Ectopic Multiple Live Births   1 0 0  3      # Outcome Date GA Lbr Tacho/2nd Weight Sex Type Anes PTL Lv   4 Term 20 39w4d 02:02 / 00:07 7 lb 13.2 oz (3.55 kg) M NORMAL SPONT EPI N SHAHLA   3 SAB 2019     SAB      2 Term 17 40w4d 06:38 / 00:06 9 lb 1 oz (4.11 kg) M NORMAL SPONT EPI N SHAHLA      Birth Comments: Born to 27 yo   No complications.  Passed hearing  Supplemented w/ some formula due to concerns of  not getting enough bm.      Complications: Variable decelerations   1 Term 12/07/14 39w6d 08:41 / 00:16 7 lb 13.2 oz (3.55 kg) M NORMAL SPONT Local N SHAHLA       GYN hx:   Menarche: 12  Period Cycle (Days): 28-31 days  Period Duration (Days): 6-7 days  Period Flow: heavy day 2-3 then lightens up  Use of Birth Control (if yes, specify type): Vasectomy  Hx Prior Abnormal Pap: No  Pap Date: 06/09/22  Pap Result Notes: wnl  Follow Up Recommendation: due      Past Medical History[1]  Past Surgical History[2]  Allergies[3]  Medications Ordered Prior to Encounter[4]  Family History[5]  Social History     Socioeconomic History    Marital status:      Spouse name: Not on file    Number of children: Not on file    Years of education: Not on file    Highest education level: Not on file   Occupational History    Not on file   Tobacco Use    Smoking status: Never    Smokeless tobacco: Never   Vaping Use    Vaping status: Never Used   Substance and Sexual Activity    Alcohol use: Not Currently    Drug use: No    Sexual activity: Yes     Partners: Male   Other Topics Concern     Service Not Asked    Blood Transfusions Not Asked    Caffeine Concern Yes    Occupational Exposure Not Asked    Hobby Hazards Not Asked    Sleep Concern Not Asked    Stress Concern Not Asked    Weight Concern Not Asked    Special Diet Not Asked    Back Care Not Asked    Exercise Yes     Comment: 2-3 x a week 30 min    Bike Helmet Not Asked    Seat Belt Yes    Self-Exams Not Asked   Social History Narrative    Not on file     Social Drivers of Health     Food Insecurity: Not on file   Transportation Needs: Not on file   Stress: Not on file   Housing Stability: Not on file       ROS:     Review of Systems:  General: denies fevers, chills, fatigue and malaise.   Eyes: no visual changes, denies headaches  ENT: no complaints, denies earaches, runny nose, epistaxis, throat pain or sore throat  Respiratory: denies SOB, dyspnea, cough or  wheezing  Cardiovascular: denies chest pain, palpitations, exercise intolerance   GI: denies abdominal pain, diarrhea, constipation  : no complaints, denies dysuria, increased urinary frequency. Menses regular, no dysmenorrhea, + menorrhagia, no dyspareunia   Hematological/lymphatic: denies history of excessive bleeding or bruising, denies dizziness, lightheadedness.   Breast: denies rashes, skin changes, pain, lumps or discharge   Psychiatric: denies depression, changes in sleep patterns, anxiety  Endocrine: denies hot or cold intolerance, mood changes   Neurological: denies changes in sight, smell, hearing or taste. Denies seizures or tremors  Immunological: denies allergies, denies anaphylaxis, or swollen lymph nodes  Musculoskeletal: denies joint pain, morning stiffness, decreased range of motion         O /80   Ht 66\"   Wt 166 lb (75.3 kg)   LMP 06/27/2025 (Exact Date)   BMI 26.79 kg/m²         Wt Readings from Last 6 Encounters:   07/03/25 166 lb (75.3 kg)   03/12/25 163 lb 2 oz (74 kg)   06/12/24 168 lb (76.2 kg)   05/28/24 166 lb (75.3 kg)   11/24/23 172 lb (78 kg)   06/09/22 165 lb 2 oz (74.9 kg)     Exam:   GENERAL: well developed, well nourished, in no apparent distress, oriented.  SKIN: no rashes, no suspicious lesions  HEENT: normal  NECK: supple; no thyromegaly, no adenopathy  LUNGS: clear to auscultation  CARDIOVASCULAR: normal S1, S2, RRR  BREASTS: soft, nontender, no palpable masses or nodes, no nipple discharge, no skin changes, no axillary adenopathy  ABDOMEN: no scars,  soft, non distended; non tender, no masses  PELVIC: External Genitalia: Normal appearing, no lesions.    Vagina: normal pink mucosa, no lesions, +scant amount of menstrual blood (at end of period)    Bladder well supported.  No  anterior or posterior hernias    Cervix: multiparous, no lesions , No CMT     Uterus: AVAF, mobile, non tender, normal size    Adnexa: non tender, no masses, normal size    Rectal:  deferred  EXTREMITIES:  non tender without edema      Lab Results   Component Value Date    SPECGRAVITY 1.015 07/03/2025    PHURINE 7.5 07/03/2025    NITRITE Negative 07/03/2025       Patient counseled on:    Diet/exercise.      Self Breast Exams     Safe sex practices / and living environment     Vaccines:  Annual Flu    Pap: neg/neg - Year:  6/2022, collected today  GC/Chlamydia:  n/a  Mammogram:  n/a, start at age 40   Dexa:  n/a  Colonoscopy / Cologuard:   n/a, start at age 45  Lipid / Cholesterol:  5/2024   Contains abnormal data LIPID PANEL W LDL/HDL RATIO  Order: [id number]   Collected 5/28/2024 12:51 PM       Status: Final result       Dx: Screening for hyperlipidemia    0 Result Notes       1 Patient Communication      Component  Ref Range & Units (hover) 5/28/24 12:51 PM   Cholesterol, Total 165   Comment: Desirable  <200 mg/dL  Borderline  200-239 mg/dL  High      >=240 mg/dL     HDL Cholesterol 73 High    Comment: Interpretive Information:  An HDL cholesterol <40 mg/dL is low and constitutes a coronary heart disease risk factor. An HDL cholesterol >60 mg/dL is a negative risk factor for coronary heart disease.     Triglycerides 34   Comment: Reference interval for fasting triglycerides  Desirable: <150 mg/dL  Borderline: 150-199 mg/dL  High: 200-499 mg/dL  Very High: >=500 mg/dL       LDL Cholesterol 84   Comment: Desirable <100 mg/dL  Borderline 100-129 mg/dL  High     >=130mg/dL     VLDL 5   Non HDL Chol 92   Comment: Desirable  <130 mg/dL  Borderline  130-159 mg/dL  High        160-189 mg/dL      Very high >=190 mg/dL     Patient Fasting for Lipid? No   Resulting [facility] ([facility])             Specimen Collected: 05/28/24 12:51 PM Last Resulted: 05/28/24  7:04 PM          A/P: Patient is 34 year old female with no complaints. Here for well woman exam.     Meds This Visit:    Requested Prescriptions      No prescriptions requested or ordered in this encounter       1. Cervical cancer screening  -  ThinPrep PAP Smear; Future  - Hpv High Risk , Thin Prep Collection; Future  - ThinPrep PAP Smear  - Hpv High Risk , Thin Prep Collection    2. Well woman exam with routine gynecological exam    3. Relies on partner vasectomy for contraception    4. Menorrhagia with regular cycle    5. Nocturia  - Urogynecology Referral - In Network  - Urinalysis [[id number]]  - Culture Urine; Future  - Culture Urine    Hx of menorrhagia with normal embx 6/2024 - discussed tx options for menorrhagia including OCPs, H-IUD, and ablation. Pt provided with pamphlets regarding tx options, RTC if she would like to pursue treatment    Pt advised to keep symptoms diary of nocturia in relation to amount of liquid she drinks before bed, diet that exacerbates symptoms, etc  UA + blood (at end of menses) and 30 protein, will send culture  Referral placed for urogyne    Return in about 1 year (around 7/3/2026) for Well Woman Exam or sooner for tx of menorrhagia.    Pinky Nath, BURT   7/3/2025  11:36 AM       This note was created by [employer] voice recognition. Errors in content may be related to improper recognition by the system; efforts to review and correct have been done but errors may still exist. Please contact me with any questions.    Note to patient and family   The 21st Century Cures Act makes medical notes available to patients in the interest of transparency.  However, please be advised that this is a medical document.  It is intended as [id number]-ezzq communication.  It is written in medical language which may contain abbreviations or verbiage that are technical and unfamiliar.  It may appear blunt or direct.  Medical documents are intended to carry relevant information, facts as evident, and the clinical opinion of the practitioner.           [1]   Past Medical History:   Anemia    during pregnancy    Dysmenorrhea   [2] History reviewed. No pertinent surgical history.  [3] No Known Allergies  [4]   Current Outpatient Medications on  File Prior to Visit   Medication Sig Dispense Refill    Ferrous Sulfate 325 (65 Fe) MG Oral Tab Take 1 tablet (325 mg total) by mouth daily.      Multiple Vitamin (MULTI-DAY VITAMINS) Oral Tab Take by mouth.      tranexamic acid (LYSTEDA) 650 MG Oral Tab Take 2 tablets (1,300 mg total) by mouth in the morning, at noon, and at bedtime. (Patient not taking: Reported on 7/3/2025) 30 tablet 5     No current facility-administered medications on file prior to visit.   [5]   Family History  Problem Relation Age of Onset    Diabetes Paternal Grandmother     Hypertension Father     Arthritis Mother     Arthritis Maternal Grandmother     Cancer Neg     Heart Disease Neg     Stroke Neg

## 2025-07-07 LAB — HPV E6+E7 MRNA CVX QL NAA+PROBE: NEGATIVE

## (undated) NOTE — MR AVS SNAPSHOT
[facility] [address] [place] [address], [address]  [phone]               Thank you for choosing us for your health care visit with Gómez Serra MD.  We are glad to serve you and happy to provide you with Instructions and Information about [facility]    Pregnancy is a stress to your body. One way in which the stress may manifest itself is through an abnormality in sugar metabolism.   In non-pregnant patients, this abno level will then be drawn one hour after ingestion. If the blood glucose value is normal and no other risk factors exist, no further testing is necessary.   If the blood glucose is abnormal, this indicates an increased risk of gestational diabetes and maggie office, you can view your past visit information in Face to Face Live by going to Visits < Visit Summaries. Face to Face Live questions? Call [phone] for help. Face to Face Live is NOT to be used for urgent needs. For medical emergencies, dial 911.            Visit [facility]

## (undated) NOTE — MR AVS SNAPSHOT
[address], [facility] [address] [id number] [phone]               Thank you for choosing us for your health care visit with Toya Carrillo MD.  We are glad to serve you and happy to provide you with this summar discharge instructions in [email or web address] by going to Visits < Admission Summaries. If you've been to the Emergency Department or your doctor's office, you can view your past visit information in [email or web address] by going to Visits < Visit Summaries. Ce[employer]ur questions?

## (undated) NOTE — MR AVS SNAPSHOT
[address], [facility] [address] [id number] [phone]               Thank you for choosing us for your health care visit with Yousif Rolon MD.  We are glad to serve you and happy to provide you with this summar This list is accurate as of: 2/25/17  1:07 PM.  Always use your most recent med list.                doxylamine-pyridoxine 10-10 MG Tbec   Take 2 tablets by mouth nightly. Commonly known as:  DICLEGIS           PRENATAL/FOLIC ACID Tabs   Take  by mouth.

## (undated) NOTE — LETTER
Neelam Dennis, :1990    CONSENT FOR PROCEDURE/SEDATION    1. I authorize the performance upon Neelam Dennis  the following: Endometrial biopsy procedure    2. I authorize Dr. Quinn Davis MD (and whomever is designated as the doctor’s assistant), to perform the above-mentioned procedures.    3. If any unforeseen conditions arise during this procedure calling for additional  procedures, operations, or medications (including anesthesia and blood transfusion), I further request and authorize the doctor to do whatever he/she deems advisable in my interest.    4. I consent to the taking and reproduction of any photographs in the course of this procedure for professional purposes.    5. I consent to the administration of such sedation as may be considered necessary or advisable by the physician responsible for this service, with the exception of ______________________________________________________    6. I have been informed by my doctor of the nature and purpose of this procedure sedation, possible alternative methods of treatment, risk involved and possible complications.    7. If I have a Do Not Resuscitate (DNR) order in place, the physician and I (or the individual authorized to consent on my behalf) will discuss and agree as to whether the Do Not Resuscitate (DNR) order will remain in effect or will be discontinued during the performance of the procedure and the applicable recovery period. If the Do Not Resuscitate (DNR) order is discontinued and is to be reinstated following the procedure/recovery period, the physician will determine when the applicable recovery period ends for purposes of reinstating the Do Not Resuscitate (DNR) order.    Signature of Patient:_______________________________________________    Signature of person authorized to consent for patient:  _______________________________________________________________    Relationship to patient:  ____________________________________________    Witness: _________________________________________ Date:___________     Physician Signature: _______________________________ Date:___________

## (undated) NOTE — MR AVS SNAPSHOT
[address], [facility] [address] [address] [phone]               Thank you for choosing us for your health care visit with Jb Peña MD.  We are glad to serve you and happy to provide you with this sum discharge instructions in [employer] by going to Visits < Admission Summaries. If you've been to the Emergency Department or your doctor's office, you can view your past visit information in [employer] by going to Visits < Visit Summaries. [employer] questions?

## (undated) NOTE — MR AVS SNAPSHOT
[address], [address] [id number] [phone]               Thank you for choosing us for your health care visit with Jailyn Tierney MD.  We are glad to serve you and happy to provide you with this sum Assoc Dx:  Encounter for  screening of mother [Z36]           Rubella, IGG    Complete by:  2017 (Approximate)    Assoc Dx:  Encounter for  screening of mother Tad Restrepo           HIV AG AB COMBO    Complete by:  2017 (Approx Eat plenty of low-fat dairy products High fat meats and dairy   Choose whole grain products Foods high in sodium   Water is best for hydration Fast food.    Eat at home when possible     Tips for increasing your physical activity – Adults who are physically

## (undated) NOTE — ED AVS SNAPSHOT
Panchito Navarrete   MRN: [id number]    Department:  [facility] Emergency Department   Date of Visit:  7/4/2019           Disclosure     Insurance plans vary and the physician(s) referred by the ER may not be covered by your plan.  Please contact tell this physician (or your personal doctor if your instructions are to return to your personal doctor) about any new or lasting problems. The primary care or specialist physician will see patients referred from the [facility] Emergency Department.  Randell Villagran

## (undated) NOTE — MR AVS SNAPSHOT
[facility], [facility]  [address] [facility] [address], [place] [address] [address] [phone]               Thank you for choosing us for your health care visit with MARY JO Padron.   We are glad to serve you and happy to provide you with this sum - [place], [phone], [address] [facility] [address]    Hours:  24-hours Phone:  [phone]    - nystatin-triamcinolone 306280-7.4 UNIT/GM-% Crea            Results of Recent Testing       MyChart     Vi

## (undated) NOTE — ED AVS SNAPSHOT
Casimir Bloch   MRN: [id number]    Department:  [facility] Emergency Department   Date of Visit:  7/6/2019           Disclosure     Insurance plans vary and the physician(s) referred by the ER may not be covered by your plan.  Please contact tell this physician (or your personal doctor if your instructions are to return to your personal doctor) about any new or lasting problems. The primary care or specialist physician will see patients referred from the [facility] Emergency Department.  Xochilt Leblanc

## (undated) NOTE — MR AVS SNAPSHOT
[address], [facility] [address] [address] [phone]               Thank you for choosing us for your health care visit with Nissa Rosales MD.  We are glad to serve you and happy to provide you with this summar [employer] questions? Call [phone] for help. [employer] is NOT to be used for urgent needs. For medical emergencies, dial 911.            Visit [facility] online at  [email or web address]

## (undated) NOTE — MR AVS SNAPSHOT
[address], [facility] [address] [id number] [phone]               Thank you for choosing us for your health care visit with Anton Gamino MD.  We are glad to serve you and happy to provide you with this sum Call [phone] for help. [employer] is NOT to be used for urgent needs. For medical emergencies, dial 911.            Visit [facility] online at  [email or web address]

## (undated) NOTE — LETTER
Dear new mom:    We've missed you! The nurses of [facility] have tried to reach you by phone to ask if you had any questions regarding your health or the care of your new little one.     Please feel free to call your doctor with an